# Patient Record
Sex: MALE | Race: WHITE | NOT HISPANIC OR LATINO | Employment: STUDENT | URBAN - METROPOLITAN AREA
[De-identification: names, ages, dates, MRNs, and addresses within clinical notes are randomized per-mention and may not be internally consistent; named-entity substitution may affect disease eponyms.]

---
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
## 2020-10-02 ENCOUNTER — TELEPHONE (OUTPATIENT)
Dept: INTERNAL MEDICINE CLINIC | Age: 20
End: 2020-10-02

## 2020-11-23 ENCOUNTER — TELEPHONE (OUTPATIENT)
Dept: INTERNAL MEDICINE CLINIC | Age: 20
End: 2020-11-23

## 2021-01-05 ENCOUNTER — TELEPHONE (OUTPATIENT)
Dept: INTERNAL MEDICINE CLINIC | Facility: CLINIC | Age: 21
End: 2021-01-05

## 2021-02-05 ENCOUNTER — OFFICE VISIT (OUTPATIENT)
Dept: INTERNAL MEDICINE CLINIC | Facility: CLINIC | Age: 21
End: 2021-02-05
Payer: COMMERCIAL

## 2021-02-05 VITALS
SYSTOLIC BLOOD PRESSURE: 116 MMHG | OXYGEN SATURATION: 99 % | DIASTOLIC BLOOD PRESSURE: 76 MMHG | TEMPERATURE: 99 F | RESPIRATION RATE: 18 BRPM | WEIGHT: 145 LBS | BODY MASS INDEX: 20.3 KG/M2 | HEIGHT: 71 IN | HEART RATE: 54 BPM

## 2021-02-05 DIAGNOSIS — F41.9 ANXIETY: ICD-10-CM

## 2021-02-05 DIAGNOSIS — E55.9 VITAMIN D DEFICIENCY: ICD-10-CM

## 2021-02-05 DIAGNOSIS — Z71.85 VACCINE COUNSELING: ICD-10-CM

## 2021-02-05 DIAGNOSIS — Z13.1 ENCOUNTER FOR SCREENING EXAMINATION FOR IMPAIRED GLUCOSE REGULATION AND DIABETES MELLITUS: ICD-10-CM

## 2021-02-05 DIAGNOSIS — Z87.898 HISTORY OF SUBSTANCE USE: ICD-10-CM

## 2021-02-05 DIAGNOSIS — Z13.220 SCREENING, LIPID: ICD-10-CM

## 2021-02-05 DIAGNOSIS — Z72.0 TOBACCO ABUSE: ICD-10-CM

## 2021-02-05 DIAGNOSIS — F95.9 FACIAL TIC: Primary | ICD-10-CM

## 2021-02-05 PROCEDURE — 99214 OFFICE O/P EST MOD 30 MIN: CPT | Performed by: PHYSICIAN ASSISTANT

## 2021-02-05 RX ORDER — CHOLECALCIFEROL (VITAMIN D3) 125 MCG
2000 CAPSULE ORAL DAILY
Qty: 30 TABLET | Refills: 5 | Status: SHIPPED | OUTPATIENT
Start: 2021-02-05 | End: 2021-10-29 | Stop reason: ALTCHOICE

## 2021-02-05 NOTE — ASSESSMENT & PLAN NOTE
Discused importance of smoking cessation  Discussed tips for cutback and quitting  Set a date, avoid others who smoke, avoid reminders of smoke  Recommended 1800quitnow for added support and assistance for patches if interested  Discussed medications patient is not interested in these at this time  4 week follow up

## 2021-02-05 NOTE — PROGRESS NOTES
[facility]  Standard Office Visit  Patient ID: Rocío Acosta    : 2000  Age/Gender: 21 y o  male     DATE: 2021      Assessment/Plan:    Tobacco abuse  Discused importance of smoking cessation  Discussed tips for cutback and quitting  Set a date, avoid others who smoke, avoid reminders of smoke  Recommended 1800quitnow for added support and assistance for patches if interested  Discussed medications patient is not interested in these at this time  4 week follow up  Facial tic  Patient previously following with neurology with [facility]   2nd opinion given for [facility] however patient has not yet scheduled this visit  Previous neurologists have made recommendations for neuropsychiatry  Patient is willing and will consider  Consult placed today  Advised him to call and schedule  Vitamin D deficiency  Will check vitamin-D level  Advised patient use over-the-counter vitamin-D    Anxiety  Discussed possible treatment options including therapy as well as medications  At this time patient is not interested in any medications  Referral placed today for neuropsychology due to his history of facial tics previously following with neurology  Will check labs prior to follow-up    History of substance use  Commended patient on sobriety from crystal meth  Patient was able to stop using on his own and has a good support system with his mother and friends  Advised him to continue to follow with Psychology as discussed above  Diagnoses and all orders for this visit:    Facial tic  -     Ambulatory referral to Psychology; Future    Anxiety  -     CBC and differential; Future  -     TSH, 3rd generation with Free T4 reflex; Future  -     Ambulatory referral to Psychology;  Future    Vitamin D deficiency  -     Cholecalciferol (Vitamin D3) 50 MCG (2000 UT) TABS; Take 1 tablet (2,000 Units total) by mouth daily  -     Vitamin D 25 hydroxy; Future    Tobacco abuse    Screening, lipid  -     CBC and differential; Future  -     Lipid Panel with Direct LDL reflex; Future    Encounter for screening examination for impaired glucose regulation and diabetes mellitus  -     Comprehensive metabolic panel; Future  -     CBC and differential; Future    History of substance use    Vaccine counseling  Comments:  Risks and benefits of influenza vaccination discussed  Patient verbalized understanding  Patient not interested in vaccination at this time  Depression Screening and Follow-up Plan: Patient's depression screening was positive with a PHQ-2 score of 2  Clincally patient does not have depression  No treatment is required  Tobacco Cessation Counseling: Tobacco cessation counseling was provided  The patient is sincerely urged to quit consumption of tobacco  He is not ready to quit tobacco  Medication options and side effects of medication discussed  Patient refused medication  Patient plans to stop smoking on his own and is not interested in any assistance at this time  Subjective:   Chief Complaint   Patient presents with    Follow-up     7 month vit d def, BMI Adult, Annual exam due, PHQ9 due  No issues or concerns    Flu Vaccine     refused         Jelena Ware is a 21 y o  male who presents to the office on 2/5/2021 for     Follow up  Feeling better at this time  No current complaints or concerns  He notes he wanted to get back on track with his medical follow up  Has not seen a provider in some time  7/2019 - worked at [employer] and notes he left because he did not feel it was a team environment  He also worked at [employer] for a very short period but notes he could not tolerate night shift  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  He has not had any specialist follow up since last visit  He notes that he has not followed with psychiatrist or neurology    He feels his mood is getting better  [employer]  Notes he lives at home with his home with her mother  Mother doing well  He and his Mom get along well  Outside of the home has a small core group of close friends who give him added support  Not currently in any relationship  He notes he has been clean of crystal meth for 1 1/2 years  He notes that he was able to get free of crystal meth using marijuana PRN  He notes that he has not used crystal meth in 1 5 years  He continues to use marijuana every other day at some times and at others his use is more spread out  Last used 2 weeks ago  He has never used any medications IV  Used he would "snort crystal meth "  Notes that when he got clean he did not have any withdrawal sx  When he was using it he reports that he would use it several times per day  He notes this time of the year is always hard on him having lost an aunt around this time several years ago  Feels he is coping better with his loss  Feeling better this year than any other year  He has talked with his friends about it who provide him with support  Has not seen a counselor  Has tried to quit and was successful in the past   Switched from cigarettes to vaping  Has since returned to cigarettes  Currently smokes 4 cigarettes per day (down from 1-1 12/ per day)  Plan to try to quit by summer  Interested in hearing options  Not yet sure of medication  Notes that he turned to crystal meth due to life stressors that have since improved  Has not used in some time  Doing much better  Anxiety  Presents for follow-up visit  Symptoms include excessive worry and nervous/anxious behavior  Patient reports no chest pain, decreased concentration, depressed mood, dizziness, irritability, nausea, palpitations, restlessness or shortness of breath          bmi  The following portions of the patient's history were reviewed and updated as appropriate: allergies, current medications, past family history, past medical history, past social history, past surgical history and problem list     Review of Systems   Constitutional: Negative for chills, fever, irritability and unexpected weight change  HENT:        Patient notes he is overdue for dental visit plans to schedule his routine cleaning  Respiratory: Negative for shortness of breath  Cardiovascular: Negative for chest pain and palpitations  Gastrointestinal: Negative for abdominal pain, diarrhea, nausea and vomiting  Skin: Negative for rash  Neurological: Negative for dizziness and light-headedness  Occasional facial tics which are worse during times of stress and anxiety  Patient does not feel he would like to take a daily medication to help with anxiety  Psychiatric/Behavioral: Negative for decreased concentration  The patient is nervous/anxious            Patient Active Problem List   Diagnosis    PVC (premature ventricular contraction)    Thumb pain, right    Seizures (HCC)    Facial tic    Vitamin D deficiency    Abnormal movements    Spell of abnormal behavior    Tic    Allergic contact dermatitis due to other agents    History of exposure to hazardous bodily fluids    History of suicidal ideation    Cyst on ear    Anxiety    Tobacco abuse    History of substance use       Past Medical History:   Diagnosis Date    ADHD     Asthma     Substance abuse ([facility] [address] ) 2019    crystal meth (last used late 2019)       Past Surgical History:   Procedure Laterality Date    NO PAST SURGERIES           Current Outpatient Medications:     Cholecalciferol (Vitamin D3) 50 MCG (2000 UT) TABS, Take 1 tablet (2,000 Units total) by mouth daily, Disp: 30 tablet, Rfl: 5    Allergies   Allergen Reactions    Bee Pollen     Dog Epithelium     Pollen Extract     David Grass Pollen Allergen     Tree Extract        Social History     Socioeconomic History    Marital status: Single     Spouse name: None    Number of children: None    Years of education: None    Highest education level: None   Occupational History    None   Social Needs    Financial resource strain: None    Food insecurity     Worry: None     Inability: None    Transportation needs     Medical: None     Non-medical: None   Tobacco Use    Smoking status: Current Every Day Smoker     Packs/day: 0 75    Smokeless tobacco: Never Used   Substance and Sexual Activity    Alcohol use: Yes     Comment: occasionally    Drug use: Yes     Types: Marijuana    Sexual activity: None   Lifestyle    Physical activity     Days per week: None     Minutes per session: None    Stress: None   Relationships    Social connections     Talks on phone: None     Gets together: None     Attends [employer]ism service: None     Active member of club or organization: None     Attends meetings of clubs or organizations: None     Relationship status: None    Intimate partner violence     Fear of current or ex partner: None     Emotionally abused: None     Physically abused: None     Forced sexual activity: None   Other Topics Concern    None   Social History Narrative    None       Family History   Problem Relation Age of Onset    Hypertension Mother     No Known Problems Father     Heart disease Maternal Grandmother     Heart disease Maternal Grandfather     Breast cancer Maternal Aunt     Thyroid disease Maternal Aunt         x2    Diabetes Maternal Aunt        PHQ-9 Depression Screening    PHQ-9:   Frequency of the following problems over the past two weeks:      Little interest or pleasure in doing things: 1 - several days  Feeling down, depressed, or hopeless: 1 - several days  PHQ-2 Score: 2         Health Maintenance   Topic Date Due    Pneumococcal Vaccine: Pediatrics (0 to 5 Years) and At-Risk Patients (6 to 59 Years) (1 of 1 - PPSV23) 02/18/2006    HPV Vaccine (1 - Male 2-dose series) 02/18/2011    HIV Screening  02/18/2015    Annual Physical  02/18/2018    Depression Screening PHQ  07/19/2020    Influenza Vaccine (1) 06/30/2021 (Originally 9/1/2020)    DTaP,Tdap,and Td Vaccines (7 - Td) 03/23/2021    BMI: Adult  02/05/2022    HIB Vaccine  Completed    Hepatitis B Vaccine  Completed    IPV Vaccine  Aged Out    Hepatitis A Vaccine  Aged Out    Meningococcal ACWY Vaccine  Aged Out       Immunization History   Administered Date(s) Administered    DTaP 5 2000, 2000, 2000, 07/01/2001, 04/07/2005    Hep B, adult 2000, 2000, 2000    Hib (PRP-OMP) 2000, 2000, 2000, 07/11/2001    INFLUENZA 11/07/2007, 02/22/2010, 10/21/2011    MMR 03/09/2001, 05/05/2004    Meningococcal MCV4P 03/23/2011    Meningococcal, Unknown Serogroups 03/23/2011    Pneumococcal Conjugate 13-Valent 2000, 2000    Tdap 03/23/2011    Varicella 03/09/2001, 01/24/2008        Objective:  Vitals:    02/05/21 1255   BP: 116/76   BP Location: Left arm   Patient Position: Sitting   Cuff Size: Standard   Pulse: (!) 54   Resp: 18   Temp: 99 °F (37 2 °C)   TempSrc: Tympanic   SpO2: 99%   Weight: 65 8 kg (145 lb)   Height: 5' 11" (1 803 m)     Wt Readings from Last 3 Encounters:   02/05/21 65 8 kg (145 lb)   07/19/19 64 1 kg (141 lb 6 4 oz) (29 %, Z= -0 55)*   04/03/19 65 6 kg (144 lb 9 6 oz) (36 %, Z= -0 36)*     * Growth percentiles are based on CDC (Boys, 2-20 Years) data  Body mass index is 20 22 kg/m²  No exam data present       Physical Exam  Vitals signs and nursing note reviewed  Constitutional:       General: He is not in acute distress  Appearance: Normal appearance  He is well-developed  He is not diaphoretic  Comments: Alert pleasant cooperative 43-year-old male seated in room in no acute distress   HENT:      Head: Normocephalic and atraumatic        Right Ear: Tympanic membrane normal       Left Ear: Tympanic membrane normal       Mouth/Throat:      Mouth: Mucous membranes are moist       Pharynx: No oropharyngeal exudate or posterior oropharyngeal erythema  Eyes:      General: No scleral icterus  Right eye: No discharge  Left eye: No discharge  Pupils: Pupils are equal, round, and reactive to light  Neck:      Musculoskeletal: Neck supple  Cardiovascular:      Rate and Rhythm: Regular rhythm  Bradycardia present  Heart sounds: Normal heart sounds  No murmur  Comments: Bradycardic  Regular rate  No murmurs  Pulmonary:      Effort: Pulmonary effort is normal  No respiratory distress  Breath sounds: Normal breath sounds  No wheezing or rales  Comments: Clear to auscultation throughout  No crackles no rhonchi no wheeze  No respiratory distress  Abdominal:      General: Bowel sounds are normal  There is no distension  Palpations: Abdomen is soft  Tenderness: There is no abdominal tenderness  There is no guarding  Comments: Positive bowel sounds soft nontender nondistended   Skin:     General: Skin is warm and dry  Neurological:      General: No focal deficit present  Mental Status: He is alert  Comments: No facial tics noted during exam    No gross focal neurologic deficits   Psychiatric:         Attention and Perception: Attention normal          Mood and Affect: Mood and affect normal          Speech: Speech normal          Behavior: Behavior normal          Thought Content: Thought content normal              Future Appointments   Date Time Provider Brittany Dahiana   3/5/2021 11:30 AM Graham Garcia PA-C [address] [place] [address]    Patient Care Team:  Graham Garcia PA-C as PCP - General (Internal Medicine)    This note was completed in part utilizing [employer]    Grammatical errors, random word insertions, spelling mistakes, and incomplete sentences can be an occasional consequence of this system secondary to software limitations, ambient noise, and hardware issues  If you have any questions or concerns about the content, text, or information contained within the body of this dictation, please contact the provider for clarification

## 2021-02-05 NOTE — ASSESSMENT & PLAN NOTE
Patient previously following with neurology with [facility]   2nd opinion given for [facility] however patient has not yet scheduled this visit  Previous neurologists have made recommendations for neuropsychiatry  Patient is willing and will consider  Consult placed today  Advised him to call and schedule

## 2021-02-05 NOTE — ASSESSMENT & PLAN NOTE
Discussed possible treatment options including therapy as well as medications  At this time patient is not interested in any medications  Referral placed today for neuropsychology due to his history of facial tics previously following with neurology    Will check labs prior to follow-up

## 2021-02-05 NOTE — PATIENT INSTRUCTIONS
Tobacco abuse  Discused importance of smoking cessation  Discussed tips for cutback and quitting  Set a date, avoid others who smoke, avoid reminders of smoke  Recommended 1800quitnow for added support and assistance for patches if interested  Discussed medications patient is not interested in these at this time  4 week follow up  Facial tic  Patient previously following with neurology with [facility]   2nd opinion given for [facility] however patient has not yet scheduled this visit  Previous neurologists have made recommendations for neuropsychiatry  Patient is willing and will consider  Consult placed today  Advised him to call and schedule  Vitamin D deficiency  Will check vitamin-D level  Advised patient use over-the-counter vitamin-D    Anxiety  Discussed possible treatment options including therapy as well as medications  At this time patient is not interested in any medications  Referral placed today for neuropsychology due to his history of facial tics previously following with neurology  Will check labs prior to follow-up    History of substance use  Commended patient on sobriety from crystal meth  Patient was able to stop using on his own and has a good support system with his mother and friends  Advised him to continue to follow with Psychology as discussed above

## 2021-02-05 NOTE — ASSESSMENT & PLAN NOTE
Commended patient on sobriety from crystal meth  Patient was able to stop using on his own and has a good support system with his mother and friends  Advised him to continue to follow with Psychology as discussed above

## 2021-10-29 ENCOUNTER — OFFICE VISIT (OUTPATIENT)
Dept: INTERNAL MEDICINE CLINIC | Facility: CLINIC | Age: 21
End: 2021-10-29
Payer: COMMERCIAL

## 2021-10-29 VITALS
DIASTOLIC BLOOD PRESSURE: 64 MMHG | SYSTOLIC BLOOD PRESSURE: 118 MMHG | OXYGEN SATURATION: 99 % | HEART RATE: 80 BPM | BODY MASS INDEX: 21.39 KG/M2 | TEMPERATURE: 98.6 F | HEIGHT: 71 IN | WEIGHT: 152.8 LBS

## 2021-10-29 DIAGNOSIS — F95.9 TIC: ICD-10-CM

## 2021-10-29 DIAGNOSIS — E55.9 VITAMIN D DEFICIENCY: ICD-10-CM

## 2021-10-29 DIAGNOSIS — Z86.69 HISTORY OF SEIZURE DISORDER: Primary | ICD-10-CM

## 2021-10-29 DIAGNOSIS — Z13.220 SCREENING, LIPID: ICD-10-CM

## 2021-10-29 DIAGNOSIS — F95.9 FACIAL TIC: ICD-10-CM

## 2021-10-29 DIAGNOSIS — Z13.1 ENCOUNTER FOR SCREENING EXAMINATION FOR IMPAIRED GLUCOSE REGULATION AND DIABETES MELLITUS: ICD-10-CM

## 2021-10-29 DIAGNOSIS — Z72.0 TOBACCO ABUSE: ICD-10-CM

## 2021-10-29 DIAGNOSIS — Z86.59 HISTORY OF SUICIDAL IDEATION: ICD-10-CM

## 2021-10-29 PROCEDURE — 99214 OFFICE O/P EST MOD 30 MIN: CPT | Performed by: PHYSICIAN ASSISTANT

## 2021-11-01 ENCOUNTER — TELEPHONE (OUTPATIENT)
Dept: PSYCHIATRY | Facility: CLINIC | Age: 21
End: 2021-11-01

## 2021-11-26 ENCOUNTER — OFFICE VISIT (OUTPATIENT)
Dept: INTERNAL MEDICINE CLINIC | Age: 21
End: 2021-11-26
Payer: COMMERCIAL

## 2021-11-26 VITALS
WEIGHT: 153.2 LBS | BODY MASS INDEX: 21.45 KG/M2 | HEART RATE: 59 BPM | OXYGEN SATURATION: 98 % | DIASTOLIC BLOOD PRESSURE: 50 MMHG | SYSTOLIC BLOOD PRESSURE: 100 MMHG | HEIGHT: 71 IN | TEMPERATURE: 98 F

## 2021-11-26 DIAGNOSIS — Z72.0 TOBACCO ABUSE: ICD-10-CM

## 2021-11-26 DIAGNOSIS — F95.9 FACIAL TIC: ICD-10-CM

## 2021-11-26 DIAGNOSIS — R56.9 SEIZURES (HCC): ICD-10-CM

## 2021-11-26 DIAGNOSIS — R25.9 ABNORMAL MOVEMENTS: ICD-10-CM

## 2021-11-26 DIAGNOSIS — Z87.898 HISTORY OF SUBSTANCE USE: ICD-10-CM

## 2021-11-26 DIAGNOSIS — I49.3 PVC (PREMATURE VENTRICULAR CONTRACTION): Primary | ICD-10-CM

## 2021-11-26 PROCEDURE — 99214 OFFICE O/P EST MOD 30 MIN: CPT | Performed by: PHYSICIAN ASSISTANT

## 2021-11-26 PROCEDURE — 3008F BODY MASS INDEX DOCD: CPT | Performed by: PHYSICIAN ASSISTANT

## 2021-11-26 PROCEDURE — 93000 ELECTROCARDIOGRAM COMPLETE: CPT | Performed by: PHYSICIAN ASSISTANT

## 2022-01-17 ENCOUNTER — TELEPHONE (OUTPATIENT)
Dept: NEUROLOGY | Facility: CLINIC | Age: 22
End: 2022-01-17

## 2022-01-17 NOTE — LETTER
Eulalia Mackey  [address]  [place] [address] [address]      Our office has been unsuccessful in trying to reach you by phone regarding:          ? Office appointment on 2/4/22 with DR Tinsley at 3pm has been canceled, 3 attempts have been made to contact you  Please contact our office at your earliest convenience  Please call [phone]  and follow the prompts        Sincerely,      [address] [facility]

## 2022-01-20 NOTE — TELEPHONE ENCOUNTER
Second attempt at trying to reach pt  Lm asking pt to call back, to reshedule 2/4/22 apt Addie Gimenez  he will not be in office

## 2022-01-21 NOTE — TELEPHONE ENCOUNTER
3rd attempt trying to reach patient about apt, canceling pt apt LM letting him know the apt will be canceled and I will send a letter in the mail as well, asked pt to call back so we can get this rescheduled for him

## 2022-06-24 ENCOUNTER — OFFICE VISIT (OUTPATIENT)
Dept: INTERNAL MEDICINE CLINIC | Facility: OTHER | Age: 22
End: 2022-06-24
Payer: MEDICARE

## 2022-06-24 VITALS
DIASTOLIC BLOOD PRESSURE: 66 MMHG | TEMPERATURE: 98 F | SYSTOLIC BLOOD PRESSURE: 116 MMHG | WEIGHT: 155 LBS | HEART RATE: 61 BPM | HEIGHT: 71 IN | OXYGEN SATURATION: 99 % | BODY MASS INDEX: 21.7 KG/M2

## 2022-06-24 DIAGNOSIS — R56.9 SEIZURES (HCC): ICD-10-CM

## 2022-06-24 DIAGNOSIS — F95.9 FACIAL TIC: ICD-10-CM

## 2022-06-24 DIAGNOSIS — F44.5: Primary | ICD-10-CM

## 2022-06-24 DIAGNOSIS — F41.9 ANXIETY: ICD-10-CM

## 2022-06-24 PROBLEM — L23.89 ALLERGIC CONTACT DERMATITIS DUE TO OTHER AGENTS: Status: RESOLVED | Noted: 2018-08-17 | Resolved: 2022-06-24

## 2022-06-24 PROBLEM — R25.9 ABNORMAL MOVEMENTS: Status: RESOLVED | Noted: 2017-11-06 | Resolved: 2022-06-24

## 2022-06-24 PROBLEM — Q18.1 CYST ON EAR: Status: RESOLVED | Noted: 2019-07-19 | Resolved: 2022-06-24

## 2022-06-24 PROCEDURE — 99214 OFFICE O/P EST MOD 30 MIN: CPT | Performed by: INTERNAL MEDICINE

## 2022-06-24 NOTE — ASSESSMENT & PLAN NOTE
I feel he is appropriate to partaking part-time classes at [employer] while going occupational occasional rehabilitation  He has an upcoming appointment scheduled with Neurology in September 2022

## 2022-06-24 NOTE — LETTER
June 24, 2022     Patient: Sayda Cuadra  YOB: 2000  Date of Visit: 6/24/2022      To Whom it May Concern:    Sayda Cuadra is under my professional care  Elliot Hinojosa was seen in my office on 6/24/2022  I feel it would be appropriate and in Paul's best interest to attend community college on a part-time schedule based on his chronic medical conditions  He should also continue working with occupational vocational rehabilitation program while attending [email or web address]  If you have any questions or concerns, please don't hesitate to call           Sincerely,          Mariana Holter, MD

## 2022-06-24 NOTE — PROGRESS NOTES
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    Seizures ([address] )    Facial tic    Anxiety                Subjective:      Patient ID: Brittney Conklin is a 25 y o  male  Chief Complaint   Patient presents with    Follow-up    ADHD     Pt is needing a written letter that stated the ADHD may not allow a full time school load  Looking to possible bet  [employer] to help with class load          25year old male is seen today for follow up of chronic conditions  He is currently attending occupational vocational rehabilitation ([employer]) and is planning to start classes at [employer]  He has undergone assessment by Dr Olga Pastor to which he was found to meet the dsm-5 criteria for diagnosis of conversion disorder with seizures, persistent, 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  According to his assessment, he states that Mr Jack Gardner does not meet the criteria of ADHD  He was never on treatment for ADHD although admits to recreational methamphetamine use  He was once on Wellbutrin however only took 2 doses in did not feel well or himself and therefore discontinued  He is requesting approval to partake in a part-time schedule at [employer] while working with [employer]         The following portions of the patient's history were reviewed and updated as appropriate: allergies, current medications, past family history, past medical history, past social history, past surgical history and problem list     Review of Systems   Constitutional: Negative for activity change, appetite change, chills, diaphoresis, fatigue and fever  HENT: Negative for congestion, postnasal drip, rhinorrhea, sinus pressure, sinus pain, sneezing and sore throat  Eyes: Negative for visual disturbance  Respiratory: Negative for apnea, cough, choking, chest tightness, shortness of breath and wheezing  Cardiovascular: Negative for chest pain, palpitations and leg swelling  Gastrointestinal: Negative for abdominal distention, abdominal pain, anal bleeding, blood in stool, constipation, diarrhea, nausea and vomiting  Endocrine: Negative for cold intolerance and heat intolerance  Genitourinary: Negative for difficulty urinating, dysuria and hematuria  Musculoskeletal: Negative  Skin: Negative  Neurological: Negative for dizziness, weakness, light-headedness, numbness and headaches  Hematological: Negative for adenopathy  Psychiatric/Behavioral: Negative for agitation, sleep disturbance and suicidal ideas  All other systems reviewed and are negative  Past Medical History:   Diagnosis Date    ADHD     Asthma     Substance abuse ([facility] [address] ) 2019    crystal meth (last used late 2019)       No current outpatient medications on file      Allergies   Allergen Reactions    Bee Pollen     Dog Epithelium     Pollen Extract     David Grass Pollen Allergen     Tree Extract        Social History   Past Surgical History:   Procedure Laterality Date    NO PAST SURGERIES       Family History   Problem Relation Age of Onset    Hypertension Mother     No Known Problems Father     Heart disease Maternal Grandmother     Heart disease Maternal Grandfather     Breast cancer Maternal Aunt     Thyroid disease Maternal Aunt         x2    Diabetes Maternal Aunt        Objective:  /66 (BP Location: Left arm, Patient Position: Sitting, Cuff Size: Adult)   Pulse 61   Temp 98 °F (36 7 °C)   Ht 5' 11" (1 803 m) Wt 70 3 kg (155 lb)   SpO2 99%   BMI 21 62 kg/m²     No results found for this or any previous visit (from the past 1344 hour(s))  Physical Exam  Vitals and nursing note reviewed  Constitutional:       General: He is not in acute distress  Appearance: He is well-developed  He is not diaphoretic  HENT:      Head: Normocephalic and atraumatic  Eyes:      General: No scleral icterus  Right eye: No discharge  Left eye: No discharge  Conjunctiva/sclera: Conjunctivae normal       Pupils: Pupils are equal, round, and reactive to light  Neck:      Thyroid: No thyromegaly  Vascular: No JVD  Cardiovascular:      Rate and Rhythm: Normal rate and regular rhythm  Heart sounds: Normal heart sounds  No murmur heard  No friction rub  No gallop  Pulmonary:      Effort: Pulmonary effort is normal  No respiratory distress  Breath sounds: Normal breath sounds  No wheezing or rales  Chest:      Chest wall: No tenderness  Abdominal:      General: Bowel sounds are normal  There is no distension  Palpations: Abdomen is soft  There is no mass  Tenderness: There is no abdominal tenderness  There is no guarding or rebound  Musculoskeletal:         General: No tenderness or deformity  Normal range of motion  Cervical back: Normal range of motion and neck supple  Lymphadenopathy:      Cervical: No cervical adenopathy  Skin:     General: Skin is warm and dry  Coloration: Skin is not pale  Findings: No erythema or rash  Neurological:      Mental Status: He is alert and oriented to person, place, and time  Cranial Nerves: No cranial nerve deficit  Coordination: Coordination normal       Deep Tendon Reflexes: Reflexes are normal and symmetric  Psychiatric:         Behavior: Behavior normal          Thought Content:  Thought content normal          Judgment: Judgment normal

## 2022-07-28 ENCOUNTER — OFFICE VISIT (OUTPATIENT)
Dept: INTERNAL MEDICINE CLINIC | Age: 22
End: 2022-07-28
Payer: MEDICARE

## 2022-07-28 VITALS
DIASTOLIC BLOOD PRESSURE: 70 MMHG | HEIGHT: 71 IN | TEMPERATURE: 98.1 F | SYSTOLIC BLOOD PRESSURE: 116 MMHG | WEIGHT: 155.4 LBS | BODY MASS INDEX: 21.76 KG/M2 | HEART RATE: 60 BPM | OXYGEN SATURATION: 99 %

## 2022-07-28 DIAGNOSIS — Z11.59 ENCOUNTER FOR HEPATITIS C SCREENING TEST FOR LOW RISK PATIENT: ICD-10-CM

## 2022-07-28 DIAGNOSIS — R22.1 NECK MASS: ICD-10-CM

## 2022-07-28 DIAGNOSIS — R53.83 FATIGUE, UNSPECIFIED TYPE: ICD-10-CM

## 2022-07-28 DIAGNOSIS — H60.02 FURUNCLE OF LEFT EAR: ICD-10-CM

## 2022-07-28 DIAGNOSIS — R59.0 ANTERIOR CERVICAL LYMPHADENOPATHY: Primary | ICD-10-CM

## 2022-07-28 PROCEDURE — 99214 OFFICE O/P EST MOD 30 MIN: CPT | Performed by: PHYSICIAN ASSISTANT

## 2022-07-28 RX ORDER — CEPHALEXIN 500 MG/1
500 CAPSULE ORAL EVERY 6 HOURS SCHEDULED
Qty: 40 CAPSULE | Refills: 0 | Status: SHIPPED | OUTPATIENT
Start: 2022-07-28 | End: 2022-08-07

## 2022-07-28 NOTE — PROGRESS NOTES
Assessment/Plan:         Diagnoses and all orders for this visit:    Anterior cervical lymphadenopathy  Comments:  heat to affected area 10 min intervals 3-4 / day  abx course  f/u in 1 week  check u/s asap  Orders:  -     Lyme Total Antibody Profile with reflex to WB; Future  -     TSH, 3rd generation; Future  -     CBC and differential; Future  -     Comprehensive metabolic panel; Future  -     EBV acute panel; Future  -     US head neck soft tissue; Future  -     cephalexin (KEFLEX) 500 mg capsule; Take 1 capsule (500 mg total) by mouth every 6 (six) hours for 10 days  -     RPR; Future  -     Hepatitis C antibody; Future  -     HIV 1/2 Antigen/Antibody (4th Generation) w Reflex [facility]; Future    Neck mass  -     Lyme Total Antibody Profile with reflex to WB; Future  -     TSH, 3rd generation; Future  -     CBC and differential; Future  -     Comprehensive metabolic panel; Future  -     EBV acute panel; Future  -     US head neck soft tissue; Future  -     RPR; Future  -     HIV 1/2 Antigen/Antibody (4th Generation) w Reflex [facility]; Future    Furuncle of left ear  Comments:  heat affected area   Orders:  -     Lyme Total Antibody Profile with reflex to WB; Future  -     TSH, 3rd generation; Future  -     CBC and differential; Future  -     Comprehensive metabolic panel; Future  -     EBV acute panel; Future  -     cephalexin (KEFLEX) 500 mg capsule; Take 1 capsule (500 mg total) by mouth every 6 (six) hours for 10 days    Fatigue, unspecified type  -     Lyme Total Antibody Profile with reflex to WB; Future  -     TSH, 3rd generation; Future  -     CBC and differential; Future  -     Comprehensive metabolic panel; Future  -     EBV acute panel; Future  -     HIV 1/2 Antigen/Antibody (4th Generation) w Reflex [facility]; Future    Encounter for hepatitis C screening test for low risk patient  -     Hepatitis C antibody;  Future        Pt instructed to reports ear pain, sore throat, tightness in throat, worsening swelling in L side neck immediately  Heat affected area with heating pad or warm washcloth 2-3 / daily   Start abx now, yogurt daily  Schedule u/s asap  F/u in office 5-7 days or sooner if any above sx develop   Check labs asap    Subjective:      Patient ID: Aidee Warren is a 25 y o  male  26 y/o male with c/o lump behind his L ear x 2 weeks, "fluid filled" and tender to touch - mild   Then 1 week ago developed swelling in his neck and tenderness in this area, swollen glands per pt   He reports it has gotten slightly larger since starting  Pt denies any fever, chills, night sweats, excessive fatigue   Afebrile in office  Denies recent URI sx, denies ST, ear pain, h/a, dizziness  Denies any other nodes in the abdomen, axilla or groin     Denies recent travel, camping, swimming in lakes or rivers  Not currently employed and has not occupational exposures   Denies recent animal bites, tick bites or bug bites   Pt has a cat but denies recent bites or scratches       The following portions of the patient's history were reviewed and updated as appropriate: allergies, current medications, past family history, past medical history, past social history, past surgical history and problem list     Review of Systems   Constitutional: Negative for activity change, appetite change, chills, diaphoresis, fatigue and fever  HENT: Negative for congestion, ear discharge, ear pain, facial swelling, postnasal drip, sinus pressure, sinus pain, sore throat and trouble swallowing  Respiratory: Negative for cough, choking, chest tightness, shortness of breath and wheezing  Cardiovascular: Negative for chest pain  Gastrointestinal: Negative for abdominal pain, constipation, diarrhea and nausea  Musculoskeletal: Negative for arthralgias and back pain  Skin: Negative for wound  Neurological: Negative for dizziness, light-headedness and headaches  Hematological: Positive for adenopathy     Psychiatric/Behavioral: Negative for sleep disturbance  The patient is not nervous/anxious  Past Medical History:   Diagnosis Date    ADHD     Asthma     Substance abuse ([address] ) 2019    crystal meth (last used late 2019)         Current Outpatient Medications:     cephalexin (KEFLEX) 500 mg capsule, Take 1 capsule (500 mg total) by mouth every 6 (six) hours for 10 days, Disp: 40 capsule, Rfl: 0    Allergies   Allergen Reactions    Bee Pollen     Cat Hair Extract Sneezing    Dog Epithelium     Pollen Extract     David Grass Pollen Allergen     Tree Extract        Social History   Past Surgical History:   Procedure Laterality Date    NO PAST SURGERIES       Family History   Problem Relation Age of Onset    Hypertension Mother     No Known Problems Father     Heart disease Maternal Grandmother     Heart disease Maternal Grandfather     Breast cancer Maternal Aunt     Thyroid disease Maternal Aunt         x2    Diabetes Maternal Aunt        Objective:  /70 (BP Location: Left arm, Patient Position: Sitting, Cuff Size: Standard)   Pulse 60   Temp 98 1 °F (36 7 °C) (Temporal)   Ht 5' 11" (1 803 m)   Wt 70 5 kg (155 lb 6 4 oz)   SpO2 99%   BMI 21 67 kg/m²        Physical Exam  Vitals reviewed  Constitutional:       General: He is not in acute distress  HENT:      Head: Normocephalic and atraumatic  Right Ear: Tympanic membrane, ear canal and external ear normal       Left Ear: Tympanic membrane, ear canal and external ear normal       Mouth/Throat:      Mouth: Mucous membranes are moist       Pharynx: No oropharyngeal exudate or posterior oropharyngeal erythema  Eyes:      Extraocular Movements: Extraocular movements intact  Conjunctiva/sclera: Conjunctivae normal       Pupils: Pupils are equal, round, and reactive to light  Cardiovascular:      Rate and Rhythm: Normal rate and regular rhythm        Comments: No axillary lymphadenpathy  Pulmonary:      Effort: Pulmonary effort is normal  No respiratory distress  Breath sounds: Normal breath sounds  No wheezing or rales  Musculoskeletal:      Cervical back: Normal range of motion  No rigidity  Right lower leg: No edema  Left lower leg: No edema  Lymphadenopathy:      Cervical: Cervical adenopathy (L sided, tender adenopathy, golf ball size nodes, no warmth or redness) present  Skin:     Findings: Lesion (small cystic pimple / furuncle behind L ear +erythema) present  No rash  Neurological:      General: No focal deficit present  Mental Status: He is alert and oriented to person, place, and time

## 2022-08-01 LAB
ALBUMIN SERPL-MCNC: 4.6 G/DL (ref 3.6–5.1)
ALBUMIN/GLOB SERPL: 2 (CALC) (ref 1–2.5)
ALP SERPL-CCNC: 61 U/L (ref 36–130)
ALT SERPL-CCNC: 16 U/L (ref 9–46)
AST SERPL-CCNC: 12 U/L (ref 10–40)
B BURGDOR AB SER IA-ACNC: <0.9 INDEX
BASOPHILS # BLD AUTO: 17 CELLS/UL (ref 0–200)
BASOPHILS NFR BLD AUTO: 0.3 %
BILIRUB SERPL-MCNC: 0.5 MG/DL (ref 0.2–1.2)
BUN SERPL-MCNC: 23 MG/DL (ref 7–25)
BUN/CREAT SERPL: NORMAL (CALC) (ref 6–22)
CALCIUM SERPL-MCNC: 9.2 MG/DL (ref 8.6–10.3)
CHLORIDE SERPL-SCNC: 104 MMOL/L (ref 98–110)
CO2 SERPL-SCNC: 28 MMOL/L (ref 20–32)
CREAT SERPL-MCNC: 1.14 MG/DL (ref 0.6–1.24)
EBV NA IGG SER IA-ACNC: <18 U/ML
EBV VCA IGG SER IA-ACNC: >750 U/ML
EBV VCA IGM SER IA-ACNC: <36 U/ML
EOSINOPHIL # BLD AUTO: 348 CELLS/UL (ref 15–500)
EOSINOPHIL NFR BLD AUTO: 6.1 %
ERYTHROCYTE [DISTWIDTH] IN BLOOD BY AUTOMATED COUNT: 12.5 % (ref 11–15)
GFR/BSA.PRED SERPLBLD CYS-BASED-ARV: 93 ML/MIN/1.73M2
GLOBULIN SER CALC-MCNC: 2.3 G/DL (CALC) (ref 1.9–3.7)
GLUCOSE SERPL-MCNC: 91 MG/DL (ref 65–99)
HCT VFR BLD AUTO: 45.5 % (ref 38.5–50)
HGB BLD-MCNC: 14.7 G/DL (ref 13.2–17.1)
LYMPHOCYTES # BLD AUTO: 2291 CELLS/UL (ref 850–3900)
LYMPHOCYTES NFR BLD AUTO: 40.2 %
MCH RBC QN AUTO: 29.5 PG (ref 27–33)
MCHC RBC AUTO-ENTMCNC: 32.3 G/DL (ref 32–36)
MCV RBC AUTO: 91.2 FL (ref 80–100)
MONOCYTES # BLD AUTO: 713 CELLS/UL (ref 200–950)
MONOCYTES NFR BLD AUTO: 12.5 %
NEUTROPHILS # BLD AUTO: 2331 CELLS/UL (ref 1500–7800)
NEUTROPHILS NFR BLD AUTO: 40.9 %
PLATELET # BLD AUTO: 229 THOUSAND/UL (ref 140–400)
PMV BLD REES-ECKER: 10.4 FL (ref 7.5–12.5)
POTASSIUM SERPL-SCNC: 4.4 MMOL/L (ref 3.5–5.3)
PROT SERPL-MCNC: 6.9 G/DL (ref 6.1–8.1)
RBC # BLD AUTO: 4.99 MILLION/UL (ref 4.2–5.8)
SODIUM SERPL-SCNC: 138 MMOL/L (ref 135–146)
TSH SERPL-ACNC: 3.38 MIU/L (ref 0.4–4.5)
WBC # BLD AUTO: 5.7 THOUSAND/UL (ref 3.8–10.8)

## 2022-08-05 ENCOUNTER — OFFICE VISIT (OUTPATIENT)
Dept: INTERNAL MEDICINE CLINIC | Facility: OTHER | Age: 22
End: 2022-08-05
Payer: MEDICARE

## 2022-08-05 VITALS
BODY MASS INDEX: 22.4 KG/M2 | DIASTOLIC BLOOD PRESSURE: 80 MMHG | SYSTOLIC BLOOD PRESSURE: 108 MMHG | OXYGEN SATURATION: 98 % | WEIGHT: 160 LBS | TEMPERATURE: 98 F | HEART RATE: 89 BPM | HEIGHT: 71 IN

## 2022-08-05 DIAGNOSIS — H60.02 FURUNCLE OF LEFT EAR: Primary | ICD-10-CM

## 2022-08-05 DIAGNOSIS — R22.1 MASS OF LEFT SIDE OF NECK: ICD-10-CM

## 2022-08-05 DIAGNOSIS — R59.0 ANTERIOR CERVICAL LYMPHADENOPATHY: ICD-10-CM

## 2022-08-05 PROCEDURE — 99213 OFFICE O/P EST LOW 20 MIN: CPT | Performed by: PHYSICIAN ASSISTANT

## 2022-08-05 NOTE — ASSESSMENT & PLAN NOTE
Per history patient to be clinically improving with the addition of Keflex  Complete entire antibiotic course  Recommend warm compresses 4 times daily  Will add additional laboratory studies as discussed below  Close one-week follow-up

## 2022-08-05 NOTE — PATIENT INSTRUCTIONS
Anterior cervical lymphadenopathy  Clinically patient reports his neck and posterior swelling has been improving since being on Keflex  Unfortunately patient was unable to get his antibiotics at the time of last visit and started his antibiotic course on 7/30  He has been taking Keflex as directed  Advised him to complete entire Keflex course  Warm compresses to the affected area 4 times daily  Advised patient to go for ultrasound today to further evaluate  Reviewed labs which does show an old mono infection that is not acutely present  Labs otherwise unremarkable (seveal labs were not drawn)  Will check RPR, HIV, Hep C, bartonella (patient has cat) and CMV in addition  Advised him to go for these labs and keep 1 week follow up  Discussed red flag symptoms and ER precautions which need immediate evaluation and med should they develop  Furuncle of left ear  Per history patient to be clinically improving with the addition of Keflex  Complete entire antibiotic course  Recommend warm compresses 4 times daily  Will add additional laboratory studies as discussed below  Close one-week follow-up  Mass of left side of neck  Printed previous order for ultrasound  Requested front office staff help patient to call and schedule with central scheduling today to confirm that it gets schedule

## 2022-08-05 NOTE — ASSESSMENT & PLAN NOTE
Printed previous order for ultrasound  Requested front office staff help patient to call and schedule with central scheduling today to confirm that it gets schedule

## 2022-08-05 NOTE — ASSESSMENT & PLAN NOTE
Clinically patient reports his neck and posterior swelling has been improving since being on Keflex  Unfortunately patient was unable to get his antibiotics at the time of last visit and started his antibiotic course on 7/30  He has been taking Keflex as directed  Advised him to complete entire Keflex course  Warm compresses to the affected area 4 times daily  Advised patient to go for ultrasound today to further evaluate  Reviewed labs which does show an old mono infection that is not acutely present  Labs otherwise unremarkable (seveal labs were not drawn)  Will check RPR, HIV, Hep C, bartonella (patient has cat) and CMV in addition  Advised him to go for these labs and keep 1 week follow up  Discussed red flag symptoms and ER precautions which need immediate evaluation and med should they develop

## 2022-08-05 NOTE — PROGRESS NOTES
[facility]  Standard Office Visit  Patient ID: Aidee Warren    : 2000  Age/Gender: 25 y o  male     DATE: 2022      Assessment/Plan:    Anterior cervical lymphadenopathy  Clinically patient reports his neck and posterior swelling has been improving since being on Keflex  Unfortunately patient was unable to get his antibiotics at the time of last visit and started his antibiotic course on   He has been taking Keflex as directed  Advised him to complete entire Keflex course  Warm compresses to the affected area 4 times daily  Advised patient to go for ultrasound today to further evaluate  Reviewed labs which does show an old mono infection that is not acutely present  Labs otherwise unremarkable (seveal labs were not drawn)  Will check RPR, HIV, Hep C, bartonella (patient has cat) and CMV in addition  Advised him to go for these labs and keep 1 week follow up  Discussed red flag symptoms and ER precautions which need immediate evaluation and med should they develop  Furuncle of left ear  Per history patient to be clinically improving with the addition of Keflex  Complete entire antibiotic course  Recommend warm compresses 4 times daily  Will add additional laboratory studies as discussed below  Close one-week follow-up  Mass of left side of neck  Printed previous order for ultrasound  Requested front office staff help patient to call and schedule with central scheduling today to confirm that it gets schedule  Diagnoses and all orders for this visit:    Furuncle of left ear  -     Bartonella anitbody panel; Future  -     HIV 1/2 Antigen/Antibody (4th Generation) w Reflex [facility]; Future  -     RPR; Future  -     Hepatitis C antibody; Future  -     CMV IgG/IgM Antibodies; Future    Anterior cervical lymphadenopathy  -     Bartonella anitbody panel;  Future  -     HIV 1/2 Antigen/Antibody (4th Generation) w Reflex [facility]; Future  -     RPR; Future  -     Hepatitis C antibody; Future  -     CMV IgG/IgM Antibodies; Future    Mass of left side of neck  -     Bartonella anitbody panel; Future  -     HIV 1/2 Antigen/Antibody (4th Generation) w Reflex [facility]; Future  -     RPR; Future  -     Hepatitis C antibody; Future  -     CMV IgG/IgM Antibodies; Future                Subjective:   Chief Complaint   Patient presents with    Follow-up     F/U cyst behind left ear, & bump on neck, review labs from 07/30/2022         Laly Robins is a 25 y o  male who presents to the office on 8/5/2022 for     30-year-old male presents to the office for 1 week follow-up after being seen and evaluated for cyst behind his left ear and left neck mass  Patient reports that on 7/28 he was seen by Kanwal Carballo in our office due to worsening left neck and ear masses  His original symptoms started about 10 days prior to his visit with Kanwal Carballo  He reports his rash started with an enlarging cyst behind his left ear that became tender to the touch  Shortly after noticing his swelling behind his left ear he reports that he developed swelling of his left neck  He has not had any difficulty eating or drinking  7/28 he was seen but notes he did not start abx until 3 days later  Started abx 7/30 (currently day 6-7)  He went for his labs but did not yet schedule his ultrasound  He reports that since starting his antibiotic he has seen a reduction in the size the swelling in both his neck and behind his left ear  He notes the areas are less tender to the touch but he has not been pressing on the area  He has not had any difficulty eating or drinking  No rash elsewhere on his body  Denies any fatigue  He does have a cat at home which she had for some time  No other cold symptoms  No fevers no chills  No runny nose no sore throat  Patient does not recall any injury  No abrasions to the skin  No skin piercings  No recent shaving    Patient denies any dental pain or throat pain  Patient denies rash elsewhere on a society  Denies any other lumps in his groin, axilla or in his neck  Patient reports that although he is taking his antibiotic now as directed he has not been doing warm compresses  The area has decreased in size of swelling and has decreased in tenderness per patient  Neck Pain   This is a new problem  The current episode started 1 to 4 weeks ago  The problem occurs daily  The problem has been gradually improving  The pain is associated with nothing  The pain is present in the anterior neck  The pain is mild  The pain is same all the time  Pertinent negatives include no chest pain, fever, pain with swallowing or trouble swallowing  The following portions of the patient's history were reviewed and updated as appropriate: allergies, current medications, past family history, past medical history, past social history, past surgical history and problem list     Review of Systems   Constitutional: Negative for fever  HENT: Negative for dental problem, ear discharge, ear pain, rhinorrhea, sinus pressure, sore throat, trouble swallowing and voice change  Respiratory: Negative for cough, shortness of breath and wheezing  Cardiovascular: Negative for chest pain  Gastrointestinal: Negative for abdominal pain, diarrhea, nausea and vomiting  Musculoskeletal: Positive for neck pain           Patient Active Problem List   Diagnosis    PVC (premature ventricular contraction)    Thumb pain, right    Seizures (HCC)    Facial tic    Vitamin D deficiency    Spell of abnormal behavior    History of exposure to hazardous bodily fluids    History of suicidal ideation    Anxiety    Tobacco abuse    History of substance use    Conversion disorder with attacks or seizures, persistent, 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    Furuncle of left ear    Anterior cervical lymphadenopathy    Mass of left side of neck       Past Medical History: Diagnosis Date    ADHD     Asthma     Substance abuse ([facility] [address] ) 2019    crystal meth (last used late 2019)       Past Surgical History:   Procedure Laterality Date    NO PAST SURGERIES           Current Outpatient Medications:     cephalexin (KEFLEX) 500 mg capsule, Take 1 capsule (500 mg total) by mouth every 6 (six) hours for 10 days, Disp: 40 capsule, Rfl: 0    Allergies   Allergen Reactions    Bee Pollen     Cat Hair Extract Sneezing    Dog Epithelium     Pollen Extract     David Grass Pollen Allergen     Tree Extract        Social History     Socioeconomic History    Marital status: Single     Spouse name: None    Number of children: None    Years of education: None    Highest education level: None   Occupational History    None   Tobacco Use    Smoking status: Current Every Day Smoker     Packs/day: 0 25     Start date: 10/29/2017    Smokeless tobacco: Never Used   Vaping Use    Vaping Use: Every day    Start date: 10/29/2017    Substances: Nicotine, THC, Flavoring   Substance and Sexual Activity    Alcohol use: Yes     Comment: Social    Drug use: Yes     Types: Marijuana    Sexual activity: None   Other Topics Concern    None   Social History Narrative    None     Social Determinants of Health     Financial Resource Strain: Not on file   Food Insecurity: Not on file   Transportation Needs: Not on file   Physical Activity: Not on file   Stress: Not on file   Social Connections: Not on file   Intimate Partner Violence: Not on file   Housing Stability: Not on file       Family History   Problem Relation Age of Onset    Hypertension Mother     No Known Problems Father     Heart disease Maternal Grandmother     Heart disease Maternal Grandfather     Breast cancer Maternal Aunt     Thyroid disease Maternal Aunt         x2    Diabetes Maternal Aunt        PHQ-2/9 Depression Screening           Health Maintenance   Topic Date Due    Hepatitis C Screening  Never done    COVID-19 Vaccine (1) Never done    Pneumococcal Vaccine: Pediatrics (0 to 5 Years) and At-Risk Patients (6 to 59 Years) (1 - PPSV23 or PCV20) 09/08/2001    HPV Vaccine (1 - Male 2-dose series) Never done    HIV Screening  Never done    Annual Physical  Never done    DTaP,Tdap,and Td Vaccines (7 - Td or Tdap) 03/23/2021    Influenza Vaccine (1) 09/01/2022    Depression Screening  06/24/2023    BMI: Adult  08/05/2023    HIB Vaccine  Completed    Hepatitis B Vaccine  Completed    IPV Vaccine  Aged Out    Hepatitis A Vaccine  Aged Out    Meningococcal ACWY Vaccine  Aged Out       Immunization History   Administered Date(s) Administered    DTaP 5 2000, 2000, 2000, 07/01/2001, 04/07/2005    Hep B, adult 2000, 2000, 2000    Hib (PRP-OMP) 2000, 2000, 2000, 07/11/2001    INFLUENZA 11/07/2007, 02/22/2010, 10/21/2011    MMR 03/09/2001, 05/05/2004    Meningococcal MCV4P 03/23/2011    Meningococcal, Unknown Serogroups 03/23/2011    Pneumococcal Conjugate 13-Valent 2000, 2000    Tdap 03/23/2011    Varicella 03/09/2001, 01/24/2008        Objective:  Vitals:    08/05/22 1329   BP: 108/80   BP Location: Left arm   Patient Position: Sitting   Cuff Size: Adult   Pulse: 89   Temp: 98 °F (36 7 °C)   TempSrc: Temporal   SpO2: 98%   Weight: 72 6 kg (160 lb)   Height: 5' 11" (1 803 m)     Wt Readings from Last 3 Encounters:   08/05/22 72 6 kg (160 lb)   07/28/22 70 5 kg (155 lb 6 4 oz)   06/24/22 70 3 kg (155 lb)     Body mass index is 22 32 kg/m²  No exam data present       Physical Exam  Vitals and nursing note reviewed  Constitutional:       General: He is not in acute distress  Appearance: He is well-developed  He is not ill-appearing, toxic-appearing or diaphoretic  Comments: Alert pleasant cooperative  Seated in room in no acute distress   HENT:      Head: Normocephalic and atraumatic          Right Ear: Tympanic membrane normal       Left Ear: Tympanic membrane normal       Mouth/Throat:      Mouth: Mucous membranes are moist       Pharynx: No oropharyngeal exudate or posterior oropharyngeal erythema  Eyes:      General: No scleral icterus  Right eye: No discharge  Left eye: No discharge  Pupils: Pupils are equal, round, and reactive to light  Neck:     Cardiovascular:      Rate and Rhythm: Normal rate and regular rhythm  Heart sounds: Normal heart sounds  No murmur heard  Pulmonary:      Effort: Pulmonary effort is normal  No respiratory distress  Breath sounds: Normal breath sounds  No wheezing or rales  Comments: Clear to auscultation throughout bilaterally  Chest:   Breasts:      Right: No axillary adenopathy or supraclavicular adenopathy  Left: No axillary adenopathy or supraclavicular adenopathy  Abdominal:      General: Bowel sounds are normal  There is no distension  Palpations: Abdomen is soft  Tenderness: There is no abdominal tenderness  There is no guarding  Comments: Positive bowel sounds soft nontender nondistended   Musculoskeletal:      Cervical back: Neck supple  No rigidity or tenderness  No pain with movement  Lymphadenopathy:      Head:      Right side of head: No submental, submandibular, tonsillar, preauricular, posterior auricular or occipital adenopathy  Left side of head: No submental, submandibular, tonsillar, preauricular or posterior auricular adenopathy  Cervical: Cervical adenopathy present  Right cervical: No superficial, deep or posterior cervical adenopathy  Left cervical: Superficial cervical adenopathy present  Upper Body:      Right upper body: No supraclavicular or axillary adenopathy  Left upper body: No supraclavicular or axillary adenopathy  Skin:     General: Skin is warm and dry  Comments: See photo   Neurological:      General: No focal deficit present  Mental Status: He is alert     Psychiatric: Mood and Affect: Mood normal          Behavior: Behavior normal          Thought Content: Thought content normal                         Future Appointments   Date Time Provider Brittany Talbot   8/9/2022 12:30 PM [address] US [address] [facility]   8/12/2022  1:30 PM Ronnell Severs, PA-C [facility]   9/27/2022  9:00 AM Daniel Hernandez MD NEURO [facility]   2/3/2023  2:30 PM Ronnell Severs, PA-C [address]    Patient Care Team:  Ronnell Severs, PA-C as PCP - General (Internal Medicine)  Richy Smith MD as PCP - [address] [place] [address] (RTE)  Richy Smith MD as PCP - [facility] (RTE)    This note was completed in part utilizing [employer] Software  Grammatical errors, random word insertions, spelling mistakes, and incomplete sentences can be an occasional consequence of this system secondary to software limitations, ambient noise, and hardware issues  If you have any questions or concerns about the content, text, or information contained within the body of this dictation, please contact the provider for clarification

## 2022-08-16 ENCOUNTER — TELEPHONE (OUTPATIENT)
Dept: INTERNAL MEDICINE CLINIC | Facility: OTHER | Age: 22
End: 2022-08-16

## 2022-08-16 ENCOUNTER — HOSPITAL ENCOUNTER (OUTPATIENT)
Dept: RADIOLOGY | Facility: IMAGING CENTER | Age: 22
Discharge: HOME | End: 2022-08-16
Payer: MEDICARE

## 2022-08-16 DIAGNOSIS — R59.0 ANTERIOR CERVICAL LYMPHADENOPATHY: ICD-10-CM

## 2022-08-16 DIAGNOSIS — R22.1 NECK MASS: ICD-10-CM

## 2022-08-16 PROCEDURE — 76536 US EXAM OF HEAD AND NECK: CPT

## 2022-08-16 NOTE — TELEPHONE ENCOUNTER
[facility] was calling to ask which bartonella you wanted since they have 3 to choose from   After talking with her the phlebotomist was just going to draw the panel which does the juarez and the henselae together also giving the IGG and IGM

## 2022-08-19 ENCOUNTER — OFFICE VISIT (OUTPATIENT)
Dept: INTERNAL MEDICINE CLINIC | Facility: OTHER | Age: 22
End: 2022-08-19
Payer: MEDICARE

## 2022-08-19 ENCOUNTER — HOSPITAL ENCOUNTER (OUTPATIENT)
Dept: RADIOLOGY | Age: 22
Discharge: HOME | End: 2022-08-19
Payer: MEDICARE

## 2022-08-19 VITALS
WEIGHT: 153 LBS | BODY MASS INDEX: 21.42 KG/M2 | DIASTOLIC BLOOD PRESSURE: 60 MMHG | TEMPERATURE: 98.3 F | HEIGHT: 71 IN | SYSTOLIC BLOOD PRESSURE: 116 MMHG | OXYGEN SATURATION: 99 % | HEART RATE: 58 BPM

## 2022-08-19 DIAGNOSIS — R22.1 MASS OF LEFT SIDE OF NECK: ICD-10-CM

## 2022-08-19 DIAGNOSIS — H60.02 FURUNCLE OF LEFT EAR: ICD-10-CM

## 2022-08-19 DIAGNOSIS — R59.0 ANTERIOR CERVICAL LYMPHADENOPATHY: ICD-10-CM

## 2022-08-19 DIAGNOSIS — R60.9 PAROTID SWELLING: ICD-10-CM

## 2022-08-19 DIAGNOSIS — H60.02 FURUNCLE OF LEFT EAR: Primary | ICD-10-CM

## 2022-08-19 PROCEDURE — G1004 CDSM NDSC: HCPCS

## 2022-08-19 PROCEDURE — 99214 OFFICE O/P EST MOD 30 MIN: CPT | Performed by: PHYSICIAN ASSISTANT

## 2022-08-19 PROCEDURE — 71260 CT THORAX DX C+: CPT

## 2022-08-19 PROCEDURE — 70491 CT SOFT TISSUE NECK W/DYE: CPT

## 2022-08-19 RX ORDER — DOXYCYCLINE 100 MG/1
100 TABLET ORAL 2 TIMES DAILY
Qty: 20 TABLET | Refills: 0 | Status: SHIPPED | OUTPATIENT
Start: 2022-08-19 | End: 2022-08-19 | Stop reason: ALTCHOICE

## 2022-08-19 RX ADMIN — IOHEXOL 65 ML: 350 INJECTION, SOLUTION INTRAVENOUS at 13:44

## 2022-08-19 NOTE — LETTER
[address] [place] [address]  [address] [facility] [address]      August 24, 2022    MRN: [id number]     Phone: [phone]     Dear Mr Alis Best recently had a(n) Cat Scan performed on 8/19/2022 at  [address] [place] [address] that was requested by Justyna Good PA-C  The study was reviewed by a radiologist, which is a physician who specializes in medical imaging  The radiologist issued a report describing his or her findings  In that report there was a finding that the radiologist felt warranted further discussion with your health care provider and that discussion would be beneficial to you  The results were sent to Justyna Good PA-C on 08/19/2022  1:56 PM  We recommend that you contact Justyna Good PA-C at [phone] or set up an appointment to discuss the results of the imaging test  If you have already heard from Justyna Good PA-C regarding the results of your study, you can disregard this letter  This letter is not meant to alarm you, but intended to encourage you to follow-up on your results with the provider that sent you for the imaging study  In addition, we have enclosed answers to frequently asked questions by other patients who have also received a letter to review results with their health care provider (see page two)  Thank you for choosing [address] [place] [address] for your medical imaging needs  FREQUENTLY ASKED QUESTIONS    1  Why am I receiving this letter? [address] [place] [address] requires us to notify patients who have findings on imaging exams that may require more testing or follow-up with a health professional within the next 3 months          2  How serious is the finding on the imaging test?  This letter is sent to all patients who may need follow-up or more testing within the next 3 months  Receiving this letter does not necessarily mean you have a life-threatening imaging finding or disease  Recommendations in the radiologists imaging report are general in nature and it is up to your healthcare provider to say whether those recommendations make sense for your situation  You are strongly encouraged to talk to your health care provider about the results and ask whether additional steps need to be taken  3  Where can I get a copy of the final report for my recent radiology exam?  To get a full copy of the report you can access your records online at [email or web address] or please contact [facility] Records Department at [phone] Monday through Friday between 8 am and 6 pm          4  What do I need to do now? Please contact your health care provider who requested the imaging study to discuss what further actions (if any) are needed  You may have already heard from (your ordering provider) in regard to this test in which case you can disregard this letter  NOTICE IN ACCORDANCE WITH THE [facility] PATIENT TEST RESULT INFORMATION ACT OF 2018    You are receiving this notice as a result of a determination by your diagnostic imaging service that further discussions of your test results are warranted and would be beneficial to you  The complete results of your test or tests have been or will be sent to the health care practitioner that ordered the test or tests  It is recommended that you contact your health care practitioner to discuss your results as soon as possible

## 2022-08-19 NOTE — PATIENT INSTRUCTIONS
Anterior cervical lymphadenopathy  Sent today for stat CT chest and neck with contrast to further evaluate persistant lymphadenopathy despite tx with Keflex (started 7/29)  Lymphadenopathy on exam shows little change  Discussed case and Tx plan with Dr Joyce Severino  Continue warm compresses  Discussed red flag sx and ER precautions which need immediate eval and tx should they develop  Pending CT results will refer to General surgery vs ENT vs IR for Bx  Bartonella pending  Lynette Bar virus positive IgG negative IgM  CMV positive IgG negative IgM  TSH normal  RPR negative  Hep C negative  Lyme negative  HIV negative  CBC Normal WBC  CMP Unremarkable    Called and reviewed CT results with patient  Made him aware of ENT evaluation and treatment  Discussed importance of him eating sour candies since our fruits which can help with parotid swelling  Warm compresses of the defect dairy  Hold on doxycycline at this time for further discussion with ENT at next week's appointment  Discussed if any difficulty making next weeks ear nose and throat appointment he should call and reschedule  Patient and mother both on-call and both verbalized understanding  Mass of left side of neck  Called and spoke with ENT office  Their office does not take patient's insurance but is willing to make exception  Appointment scheduled Tuesday 9:45AM with Dr Quan Hunt  Patient and mother aware of appointment  [address] [facility]  [address]    Phone:  [phone]    Furuncle of left ear  See plan above

## 2022-08-19 NOTE — ASSESSMENT & PLAN NOTE
Called and spoke with ENT office  Their office does not take patient's insurance but is willing to make exception  Appointment scheduled Tuesday 9:45AM with Dr Ruddy Edmond  Patient and mother aware of appointment  [address]    Phone:  [phone]

## 2022-08-19 NOTE — RESULT ENCOUNTER NOTE
Son Burns,  Thanks for the heads up  Patient was seen and evaluated today by myself and sent for additional imaging  He will be seeing ENT next week  I appreciate you following up with me, I just wanted to close the loop  Have a good day      Lalito

## 2022-08-19 NOTE — ASSESSMENT & PLAN NOTE
Sent today for stat CT chest and neck with contrast to further evaluate persistant lymphadenopathy despite tx with Keflex (started 7/29)  Lymphadenopathy on exam shows little change  Discussed case and Tx plan with Dr Ferdinand Gonzalez  Continue warm compresses  Discussed red flag sx and ER precautions which need immediate eval and tx should they develop  Pending CT results will refer to General surgery vs ENT vs IR for Bx  Bartonella pending  Lynette Bar virus positive IgG negative IgM  CMV positive IgG negative IgM  TSH normal  RPR negative  Hep C negative  Lyme negative  HIV negative  CBC Normal WBC  CMP Unremarkable    Called and reviewed CT results with patient  Made him aware of ENT evaluation and treatment  Discussed importance of him eating sour candies since our fruits which can help with parotid swelling  Warm compresses of the defect dairy  Hold on doxycycline at this time for further discussion with ENT at next week's appointment  Discussed if any difficulty making next weeks ear nose and throat appointment he should call and reschedule  Patient and mother both on-call and both verbalized understanding

## 2022-08-19 NOTE — RESULT ENCOUNTER NOTE
Reviewed with patient and Mother results by phone  ENT contacted  Patient schedule appointment next week  See office note for details

## 2022-08-19 NOTE — PROGRESS NOTES
[facility]  Standard Office Visit  Patient ID: Eulalia Mackey    : 2000  Age/Gender: 25 y o  male     DATE: 2022      Assessment/Plan:    Anterior cervical lymphadenopathy  Sent today for stat CT chest and neck with contrast to further evaluate persistant lymphadenopathy despite tx with Keflex (started )  Lymphadenopathy on exam shows little change  Discussed case and Tx plan with Dr Kwesi Bang  Continue warm compresses  Discussed red flag sx and ER precautions which need immediate eval and tx should they develop  Pending CT results will refer to General surgery vs ENT vs IR for Bx  Bartonella pending  Lynette Bar virus positive IgG negative IgM  CMV positive IgG negative IgM  TSH normal  RPR negative  Hep C negative  Lyme negative  HIV negative  CBC Normal WBC  CMP Unremarkable    Called and reviewed CT results with patient  Made him aware of ENT evaluation and treatment  Discussed importance of him eating sour candies since our fruits which can help with parotid swelling  Warm compresses of the defect dairy  Hold on doxycycline at this time for further discussion with ENT at next week's appointment  Discussed if any difficulty making next weeks ear nose and throat appointment he should call and reschedule  Patient and mother both on-call and both verbalized understanding  Mass of left side of neck  Called and spoke with ENT office  Their office does not take patient's insurance but is willing to make exception  Appointment scheduled Tuesday 9:45AM with Dr Robson Ruiz  Patient and mother aware of appointment  [facility] [address]    Phone:  [phone]    Furuncle of left ear  See plan above  Diagnoses and all orders for this visit:    Furuncle of left ear  -     Discontinue: doxycycline (ADOXA) 100 MG tablet;  Take 1 tablet (100 mg total) by mouth 2 (two) times a day for 10 days  -     Cancel: CT soft tissue neck w wo contrast; Future  -     CT chest w contrast; Future  -     Ambulatory Referral to Otolaryngology; Future    Mass of left side of neck  -     Discontinue: doxycycline (ADOXA) 100 MG tablet; Take 1 tablet (100 mg total) by mouth 2 (two) times a day for 10 days  -     Cancel: CT soft tissue neck w wo contrast; Future  -     CT chest w contrast; Future  -     Ambulatory Referral to Otolaryngology; Future    Anterior cervical lymphadenopathy  -     Discontinue: doxycycline (ADOXA) 100 MG tablet; Take 1 tablet (100 mg total) by mouth 2 (two) times a day for 10 days  -     Cancel: CT soft tissue neck w wo contrast; Future  -     CT chest w contrast; Future  -     Ambulatory Referral to Otolaryngology; Future    Parotid swelling  Comments:  Reviewed imaging  Keep ENT appointment  Orders:  -     Ambulatory Referral to Otolaryngology; Future                Subjective:   Chief Complaint   Patient presents with    Follow-up     1 week follow up and review US results from 8/16/2022           Dani Mckeon is a 25 y o  male who presents to the office on 8/19/2022 for       20-year-old male with a history swelling behind his left ear as well as left neck swelling presents to the office for further evaluation after undergoing antibiotic management additional laboratory studies and ultrasound  Patient reports that his left-sided neck swelling and left posterior use swelling is unchanged  The area is not tender but he does note that it is there  No difficulty with eating or drinking  No difficulty with chewing or swallowing  Denies any dental pain  Denies any fevers or chills  Denies any unintentional weight loss  Denies any pain with swallowing  Previous history of substance abuse in the past but denies any history of IV drug use  At this time patient denies any pain in his neck and rates it as a 0 /10    The swelling does not change throughout the day and does not change with change in head positioning or daily activity  He has tried warm compresses without improvement and completed Keflex course  Patient does not have any known history thyroid cancer or lymphoma in the family per his recollection  Neck Pain   This is a recurrent problem  The current episode started 1 to 4 weeks ago  The problem occurs constantly  The problem has been unchanged  The pain is associated with nothing  The pain is present in the anterior neck and left side  The pain is at a severity of 0/10  The patient is experiencing no pain  The pain is same all the time  Pertinent negatives include no chest pain, fever, headaches, pain with swallowing, paresis, trouble swallowing or weight loss  He has tried nothing for the symptoms  The following portions of the patient's history were reviewed and updated as appropriate: allergies, current medications, past family history, past medical history, past social history, past surgical history and problem list     Review of Systems   Constitutional: Negative for chills, fever, unexpected weight change and weight loss  HENT: Negative for dental problem, drooling, postnasal drip, rhinorrhea, sore throat, trouble swallowing and voice change  Respiratory: Negative for cough, shortness of breath and wheezing  Cardiovascular: Negative for chest pain and palpitations  Gastrointestinal: Negative for abdominal pain, diarrhea, nausea and vomiting  Musculoskeletal: Positive for neck pain  Skin: Negative for rash  Neurological: Negative for dizziness and headaches           Patient Active Problem List   Diagnosis    PVC (premature ventricular contraction)    Thumb pain, right    Seizures (HCC)    Facial tic    Vitamin D deficiency    Spell of abnormal behavior    History of exposure to hazardous bodily fluids    History of suicidal ideation    Anxiety    Tobacco abuse    History of substance use    Conversion disorder with attacks or seizures, persistent, 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    Furuncle of left ear    Anterior cervical lymphadenopathy    Mass of left side of neck    Parotid swelling       Past Medical History:   Diagnosis Date    ADHD     Asthma     Substance abuse ([address] ) 2019    crystal meth (last used late 2019)       Past Surgical History:   Procedure Laterality Date    NO PAST SURGERIES         No current outpatient medications on file  No current facility-administered medications for this visit      Allergies   Allergen Reactions    Bee Pollen     Cat Hair Extract Sneezing    Dog Epithelium     Pollen Extract     David Grass Pollen Allergen     Tree Extract        Social History     Socioeconomic History    Marital status: Single     Spouse name: None    Number of children: None    Years of education: None    Highest education level: None   Occupational History    None   Tobacco Use    Smoking status: Current Every Day Smoker     Start date: 10/29/2017    Smokeless tobacco: Never Used    Tobacco comment: Vaping   Vaping Use    Vaping Use: Every day    Start date: 10/29/2017    Substances: Nicotine, THC, Flavoring   Substance and Sexual Activity    Alcohol use: Yes     Comment: Social    Drug use: Yes     Types: Marijuana    Sexual activity: None   Other Topics Concern    None   Social History Narrative    None     Social Determinants of Health     Financial Resource Strain: Not on file   Food Insecurity: Not on file   Transportation Needs: Not on file   Physical Activity: Not on file   Stress: Not on file   Social Connections: Not on file   Intimate Partner Violence: Not on file   Housing Stability: Not on file       Family History   Problem Relation Age of Onset    Hypertension Mother     No Known Problems Father     Heart disease Maternal Grandmother     Heart disease Maternal Grandfather     Breast cancer Maternal Aunt     Thyroid disease Maternal Aunt         x2    Diabetes Maternal Aunt PHQ-2/9 Depression Screening           Health Maintenance   Topic Date Due    COVID-19 Vaccine (1) Never done    Pneumococcal Vaccine: Pediatrics (0 to 5 Years) and At-Risk Patients (6 to 59 Years) (1 - PPSV23 or PCV20) 09/08/2001    HPV Vaccine (1 - Male 2-dose series) Never done    Annual Physical  Never done    DTaP,Tdap,and Td Vaccines (7 - Td or Tdap) 03/23/2021    Influenza Vaccine (1) 09/01/2022    Depression Screening  06/24/2023    BMI: Adult  08/19/2023    HIV Screening  Completed    Hepatitis C Screening  Completed    HIB Vaccine  Completed    Hepatitis B Vaccine  Completed    IPV Vaccine  Aged Out    Hepatitis A Vaccine  Aged Out    Meningococcal ACWY Vaccine  Aged Out       Immunization History   Administered Date(s) Administered    DTaP 5 2000, 2000, 2000, 07/01/2001, 04/07/2005    Hep B, adult 2000, 2000, 2000    Hib (PRP-OMP) 2000, 2000, 2000, 07/11/2001    INFLUENZA 11/07/2007, 02/22/2010, 10/21/2011    MMR 03/09/2001, 05/05/2004    Meningococcal MCV4P 03/23/2011    Meningococcal, Unknown Serogroups 03/23/2011    Pneumococcal Conjugate 13-Valent 2000, 2000    Tdap 03/23/2011    Varicella 03/09/2001, 01/24/2008        Objective:  Vitals:    08/19/22 1130   BP: 116/60   BP Location: Left arm   Patient Position: Sitting   Cuff Size: Adult   Pulse: 58   Temp: 98 3 °F (36 8 °C)   TempSrc: Temporal   SpO2: 99%   Weight: 69 4 kg (153 lb)   Height: 5' 11" (1 803 m)     Wt Readings from Last 3 Encounters:   08/19/22 69 4 kg (153 lb)   08/05/22 72 6 kg (160 lb)   07/28/22 70 5 kg (155 lb 6 4 oz)     Body mass index is 21 34 kg/m²  No exam data present       Physical Exam  Vitals and nursing note reviewed  Constitutional:       General: He is not in acute distress  Appearance: Normal appearance  He is well-developed  He is not diaphoretic  Comments: Alert pleasant cooperative    Seated in room in no acute distress   HENT:      Head: Normocephalic and atraumatic  Right Ear: Tympanic membrane, ear canal and external ear normal  There is no impacted cerumen  Left Ear: Tympanic membrane, ear canal and external ear normal  There is no impacted cerumen  Nose: Nose normal       Mouth/Throat:      Mouth: Mucous membranes are moist       Pharynx: No oropharyngeal exudate or posterior oropharyngeal erythema  Eyes:      General: No scleral icterus  Right eye: No discharge  Left eye: No discharge  Pupils: Pupils are equal, round, and reactive to light  Neck:     Cardiovascular:      Rate and Rhythm: Normal rate and regular rhythm  Heart sounds: Normal heart sounds  No murmur heard  Pulmonary:      Effort: Pulmonary effort is normal  No respiratory distress  Breath sounds: Normal breath sounds  No wheezing or rales  Comments: Clear to auscultation throughout bilaterally  No crackles no rhonchi no wheeze  No respiratory distress  Abdominal:      General: Bowel sounds are normal  There is no distension  Palpations: Abdomen is soft  Tenderness: There is no abdominal tenderness  There is no guarding  Comments: Positive bowel sounds soft nontender   Musculoskeletal:      Cervical back: Neck supple  No rigidity or tenderness  Right lower leg: No edema  Left lower leg: No edema  Lymphadenopathy:      Cervical: Cervical adenopathy present  Skin:     General: Skin is warm and dry  Neurological:      General: No focal deficit present  Mental Status: He is alert and oriented to person, place, and time  Psychiatric:         Mood and Affect: Mood normal          Behavior: Behavior normal          Thought Content:  Thought content normal              Future Appointments   Date Time Provider Brittany Talbot   8/23/2022  9:45 AM Kaiden Shi MD [address] [place] [address] ENT [address]   8/26/2022 11:30 AM Roopa Villarreal PA-C [facility] 9/27/2022  9:00 AM Albert Edmondson MD NEURO [facility]   2/3/2023  2:30 PM Dg Ferguson PA-C [address]    Patient Care Team:  Dg Ferguson PA-C as PCP - General (Internal Medicine)  Ladonna Lin MD as PCP - [address] [place] [address] (RTE)  Ladonna Lin MD as PCP - [facility] (RTE)    This note was completed in part utilizing M-Modal Fluency Direct Software  Grammatical errors, random word insertions, spelling mistakes, and incomplete sentences can be an occasional consequence of this system secondary to software limitations, ambient noise, and hardware issues  If you have any questions or concerns about the content, text, or information contained within the body of this dictation, please contact the provider for clarification

## 2022-08-21 LAB
B HENSELAE IGG SER QL: NEGATIVE
B HENSELAE IGM SER QL: NEGATIVE
B QUINTANA IGG SER QL: NEGATIVE
B QUINTANA IGM SER QL: NEGATIVE
CMV IGG SERPL IA-ACNC: 4.9 U/ML
CMV IGM SERPL IA-ACNC: <30 AU/ML
HCV AB S/CO SERPL IA: 0.07
HCV AB SERPL QL IA: NORMAL
HIV 1+2 AB+HIV1 P24 AG SERPL QL IA: NORMAL
RPR SER QL: NORMAL

## 2022-09-09 ENCOUNTER — OFFICE VISIT (OUTPATIENT)
Dept: INTERNAL MEDICINE CLINIC | Facility: OTHER | Age: 22
End: 2022-09-09
Payer: MEDICARE

## 2022-09-09 VITALS
TEMPERATURE: 98.7 F | DIASTOLIC BLOOD PRESSURE: 78 MMHG | OXYGEN SATURATION: 99 % | HEIGHT: 71 IN | BODY MASS INDEX: 21.98 KG/M2 | SYSTOLIC BLOOD PRESSURE: 110 MMHG | WEIGHT: 157 LBS | HEART RATE: 74 BPM

## 2022-09-09 DIAGNOSIS — R59.0 CERVICAL LYMPHADENOPATHY: Primary | ICD-10-CM

## 2022-09-09 PROCEDURE — 99213 OFFICE O/P EST LOW 20 MIN: CPT | Performed by: PHYSICIAN ASSISTANT

## 2022-09-09 RX ORDER — AMOXICILLIN AND CLAVULANATE POTASSIUM 875; 125 MG/1; MG/1
1 TABLET, FILM COATED ORAL EVERY 12 HOURS SCHEDULED
COMMUNITY

## 2022-09-09 NOTE — PATIENT INSTRUCTIONS
Cervical lymphadenopathy  Reviewed ENT note  Patient is just about complete with Augmentin course but swelling persists  I discussed importance of going for IR biopsy as ordered by ENT  Discussed risks and benefits of procedure  Patient verbalized understanding and is willing  Office will call and schedule prior to him leaving today

## 2022-09-09 NOTE — ASSESSMENT & PLAN NOTE
Reviewed ENT note  Patient is just about complete with Augmentin course but swelling persists  I discussed importance of going for IR biopsy as ordered by ENT  Discussed risks and benefits of procedure  Patient verbalized understanding and is willing  Office will call and schedule prior to him leaving today  Keep ENT follow-up    Keep our office informed of any change in treatment plan

## 2022-09-09 NOTE — PROGRESS NOTES
[facility] [place]  Standard Office Visit  Patient ID: Lea Gonzalez    : 2000  Age/Gender: 25 y o  male     DATE: 2022      Assessment/Plan:    Cervical lymphadenopathy  Reviewed ENT note  Patient is just about complete with Augmentin course but swelling persists  I discussed importance of going for IR biopsy as ordered by ENT  Discussed risks and benefits of procedure  Patient verbalized understanding and is willing  Office will call and schedule prior to him leaving today  Keep ENT follow-up  Keep our office informed of any change in treatment plan       Diagnoses and all orders for this visit:    Cervical lymphadenopathy    Other orders  -     amoxicillin-clavulanate (AUGMENTIN) 875-125 mg per tablet; Take 1 tablet by mouth every 12 (twelve) hours                Subjective:   Chief Complaint   Patient presents with    Follow-up     1 week follow up to review results          Lea Gonzalez is a 25 y o  male who presents to the office on 2022 for       26 yo male to follow up neck/face swelling  He was seen and evaluated by ENT  Given 14 day supply of antibiotic and recommended biopsy  Patient notes there has been little change on the antibiotics  ENT attmpted to drain cyst behind L ear without success  No fevers  No dental pain  NO difficulty eating or drinking  Notes he has some L neck fullness  He had single episode of sharp pain over the area of swelling but has since resolved  Neck Pain   This is a recurrent problem  The problem has been unchanged  The pain is associated with nothing  The pain is present in the anterior neck and left side  Nothing aggravates the symptoms  Pertinent negatives include no chest pain, fever, headaches, pain with swallowing, syncope or trouble swallowing  He has tried NSAIDs and heat (Antibiotics) for the symptoms  The treatment provided no relief         The following portions of the patient's history were reviewed and updated as appropriate: allergies, current medications, past family history, past medical history, past social history, past surgical history and problem list     Review of Systems   Constitutional: Negative for chills, fever and unexpected weight change  HENT: Negative for trouble swallowing  Respiratory: Negative for cough, shortness of breath and wheezing  Cardiovascular: Negative for chest pain, palpitations and syncope  Gastrointestinal: Negative for abdominal pain, diarrhea and vomiting  Musculoskeletal: Positive for neck pain  Neurological: Negative for headaches           Patient Active Problem List   Diagnosis    PVC (premature ventricular contraction)    Thumb pain, right    Seizures (HCC)    Facial tic    Vitamin D deficiency    Spell of abnormal behavior    History of exposure to hazardous bodily fluids    History of suicidal ideation    Anxiety    Tobacco abuse    History of substance use    Conversion disorder with attacks or seizures, persistent, 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    Furuncle of left ear    Cervical lymphadenopathy    Mass of left side of neck    Parotid swelling       Past Medical History:   Diagnosis Date    ADHD     Asthma     [facility]    crystal meth (last used late 2019)       Past Surgical History:   Procedure Laterality Date    NO PAST SURGERIES           Current Outpatient Medications:     amoxicillin-clavulanate (AUGMENTIN) 875-125 mg per tablet, Take 1 tablet by mouth every 12 (twelve) hours, Disp: , Rfl:     Allergies   Allergen Reactions    Bee Pollen     Cat Hair Extract Sneezing    Dog Epithelium     Pollen Extract     David Grass Pollen Allergen     Tree Extract        Social History     Socioeconomic History    Marital status: Single     Spouse name: None    Number of children: None    Years of education: None    Highest education level: None   Occupational History    None   Tobacco Use    Smoking status: Current Every Day Smoker     Start date: 10/29/2017    Smokeless tobacco: Never Used    Tobacco comment: Vaping   Vaping Use    Vaping Use: Every day    Start date: 10/29/2017    Substances: Nicotine, THC, Flavoring   Substance and Sexual Activity    Alcohol use: Yes     Comment: Social    Drug use: Yes     Types: Marijuana    Sexual activity: None   Other Topics Concern    None   Social History Narrative    None     Social Determinants of Health     Financial Resource Strain: Not on file   Food Insecurity: Not on file   Transportation Needs: Not on file   Physical Activity: Not on file   Stress: Not on file   Social Connections: Not on file   Intimate Partner Violence: Not on file   Housing Stability: Not on file       Family History   Problem Relation Age of Onset    Hypertension Mother     No Known Problems Father     Heart disease Maternal Grandmother     Heart disease Maternal Grandfather     Breast cancer Maternal Aunt     Thyroid disease Maternal Aunt         x2    Diabetes Maternal Aunt        PHQ-2/9 Depression Screening    Little interest or pleasure in doing things: 0 - not at all  Feeling down, depressed, or hopeless: 0 - not at all  PHQ-2 Score: 0  PHQ-2 Interpretation: Negative depression screen         Health Maintenance   Topic Date Due    COVID-19 Vaccine (1) Never done    Pneumococcal Vaccine: Pediatrics (0 to 5 Years) and At-Risk Patients (6 to 59 Years) (1 - PPSV23 or PCV20) 09/08/2001    HPV Vaccine (1 - Male 2-dose series) Never done    Annual Physical  Never done    DTaP,Tdap,and Td Vaccines (7 - Td or Tdap) 03/23/2021    Influenza Vaccine (1) 09/01/2022    Depression Screening  09/09/2023    BMI: Adult  09/09/2023    HIV Screening  Completed    Hepatitis C Screening  Completed    HIB Vaccine  Completed    Hepatitis B Vaccine  Completed    IPV Vaccine  Aged Out    Hepatitis A Vaccine  Aged Out    Meningococcal ACWY Vaccine Aged [employer] History   Administered Date(s) Administered    DTaP 5 2000, 2000, 2000, 07/01/2001, 04/07/2005    Hep B, adult 2000, 2000, 2000    Hib (PRP-OMP) 2000, 2000, 2000, 07/11/2001    INFLUENZA 11/07/2007, 02/22/2010, 10/21/2011    MMR 03/09/2001, 05/05/2004    Meningococcal MCV4P 03/23/2011    Meningococcal, Unknown Serogroups 03/23/2011    Pneumococcal Conjugate 13-Valent 2000, 2000    Tdap 03/23/2011    Varicella 03/09/2001, 01/24/2008        Objective:  Vitals:    09/09/22 1330   BP: 110/78   BP Location: Left arm   Patient Position: Sitting   Cuff Size: Adult   Pulse: 74   Temp: 98 7 °F (37 1 °C)   TempSrc: Temporal   SpO2: 99%   Weight: 71 2 kg (157 lb)   Height: 5' 11" (1 803 m)     Wt Readings from Last 3 Encounters:   09/09/22 71 2 kg (157 lb)   08/23/22 69 4 kg (153 lb)   08/19/22 69 4 kg (153 lb)     Body mass index is 21 9 kg/m²  No exam data present       Physical Exam  Vitals and nursing note reviewed  Constitutional:       General: He is not in acute distress  Appearance: He is well-developed  He is not ill-appearing, toxic-appearing or diaphoretic  Comments: Alert pleasant cooperative  Seated in room in no acute distress  HENT:      Head: Normocephalic and atraumatic  Right Ear: Tympanic membrane normal       Left Ear: Tympanic membrane normal       Mouth/Throat:      Mouth: Mucous membranes are moist       Pharynx: No oropharyngeal exudate or posterior oropharyngeal erythema  Eyes:      General: No scleral icterus  Right eye: No discharge  Left eye: No discharge  Pupils: Pupils are equal, round, and reactive to light  Neck:     Cardiovascular:      Rate and Rhythm: Normal rate and regular rhythm  Heart sounds: Normal heart sounds  No murmur heard  Pulmonary:      Effort: Pulmonary effort is normal  No respiratory distress        Breath sounds: Normal breath sounds  No wheezing or rales  Comments: Clear to auscultation throughout bilaterally  Chest:   Breasts:      Right: No axillary adenopathy or supraclavicular adenopathy  Left: No axillary adenopathy or supraclavicular adenopathy  Abdominal:      General: Bowel sounds are normal  There is no distension  Palpations: Abdomen is soft  Tenderness: There is no abdominal tenderness  There is no guarding  Musculoskeletal:      Cervical back: Neck supple  Lymphadenopathy:      Head:      Right side of head: No submandibular, preauricular, posterior auricular or occipital adenopathy  Left side of head: Submandibular and preauricular adenopathy present  No posterior auricular or occipital adenopathy  Cervical: Cervical adenopathy present  Right cervical: No posterior cervical adenopathy  Left cervical: No posterior cervical adenopathy  Upper Body:      Right upper body: No supraclavicular or axillary adenopathy  Left upper body: No supraclavicular or axillary adenopathy  Lower Body: No right inguinal adenopathy  No left inguinal adenopathy  Neurological:      General: No focal deficit present  Mental Status: He is alert  Psychiatric:         Mood and Affect: Mood normal          Behavior: Behavior normal          Thought Content: Thought content normal      See photo            Future Appointments   Date Time Provider Brittany Dahiana   9/19/2022  9:00 AM BE  [facility]   9/27/2022  9:00 AM Nicole Anaya MD NEURO [facility]   2/3/2023  2:30 PM German Wray PA-C [address]    Patient Care Team:  German Wray PA-C as PCP - General (Internal Medicine)  Madina Reyes MD as PCP - [address] [place] [address] (RTE)  Madina Reyes MD as PCP - [facility] (RTE)    This note was completed in part utilizing [employer] Direct Software  Grammatical errors, random word insertions, spelling mistakes, and incomplete sentences can be an occasional consequence of this system secondary to software limitations, ambient noise, and hardware issues  If you have any questions or concerns about the content, text, or information contained within the body of this dictation, please contact the provider for clarification

## 2022-09-12 NOTE — NURSING NOTE
Call placed to patient to discuss upcoming appointment at [facility] radiology department and complete consultation with patient  Patient is having lymph node biopsy utilizing US guidance  Reviewed patient's allergies, no current anticoagulant medication present, also discussed the pre and post procedure expectations  Reminded patient of location and time expected for procedure, Patient expressed understanding by verbalizing and repeating instructions

## 2022-09-19 ENCOUNTER — HOSPITAL ENCOUNTER (OUTPATIENT)
Dept: RADIOLOGY | Facility: HOSPITAL | Age: 22
Discharge: HOME | End: 2022-09-19
Attending: STUDENT IN AN ORGANIZED HEALTH CARE EDUCATION/TRAINING PROGRAM
Payer: MEDICARE

## 2022-09-19 DIAGNOSIS — R59.0 CERVICAL LYMPHADENOPATHY: ICD-10-CM

## 2022-09-19 PROCEDURE — 88305 TISSUE EXAM BY PATHOLOGIST: CPT | Performed by: PATHOLOGY

## 2022-09-19 PROCEDURE — 10005 FNA BX W/US GDN 1ST LES: CPT

## 2022-09-19 PROCEDURE — 88173 CYTOPATH EVAL FNA REPORT: CPT | Performed by: PATHOLOGY

## 2022-09-19 RX ORDER — LIDOCAINE HYDROCHLORIDE 10 MG/ML
2 INJECTION, SOLUTION EPIDURAL; INFILTRATION; INTRACAUDAL; PERINEURAL ONCE
Status: COMPLETED | OUTPATIENT
Start: 2022-09-19 | End: 2022-09-19

## 2022-09-19 RX ADMIN — LIDOCAINE HYDROCHLORIDE 2 ML: 10 INJECTION, SOLUTION EPIDURAL; INFILTRATION; INTRACAUDAL; PERINEURAL at 09:37

## 2022-09-21 PROCEDURE — 88173 CYTOPATH EVAL FNA REPORT: CPT | Performed by: PATHOLOGY

## 2022-09-21 PROCEDURE — 88305 TISSUE EXAM BY PATHOLOGIST: CPT | Performed by: PATHOLOGY

## 2022-09-22 LAB — SCAN RESULT: NORMAL

## 2022-09-27 ENCOUNTER — CONSULT (OUTPATIENT)
Dept: NEUROLOGY | Facility: CLINIC | Age: 22
End: 2022-09-27
Payer: MEDICARE

## 2022-09-27 VITALS
WEIGHT: 163.7 LBS | SYSTOLIC BLOOD PRESSURE: 112 MMHG | BODY MASS INDEX: 22.83 KG/M2 | HEART RATE: 75 BPM | DIASTOLIC BLOOD PRESSURE: 74 MMHG

## 2022-09-27 DIAGNOSIS — F95.9 FACIAL TIC: ICD-10-CM

## 2022-09-27 DIAGNOSIS — F95.9 TIC: ICD-10-CM

## 2022-09-27 DIAGNOSIS — Z86.69 HISTORY OF SEIZURE DISORDER: ICD-10-CM

## 2022-09-27 DIAGNOSIS — F44.5 PSYCHOGENIC NONEPILEPTIC SEIZURE: Primary | ICD-10-CM

## 2022-09-27 PROCEDURE — 99245 OFF/OP CONSLTJ NEW/EST HI 55: CPT | Performed by: STUDENT IN AN ORGANIZED HEALTH CARE EDUCATION/TRAINING PROGRAM

## 2022-09-27 NOTE — PROGRESS NOTES
[place] [address] [facility]  Initial Consultation    Impression/Plan    Aidee Warren is a 25 y o  male with a PMH of Tourette syndrome, ADHD, and ambulatory EEG captured PNES presenting to the [place] [address] [facility] for a second opinion of his PNES  I have reviewed his EEG reports and MRI from [facility] in 2017 and it seems that good quality data was recorded from this time  Typical events were captured from ambulatory EEG with no EEG correlate consistent with PNES  The events he still has are the same since 2017  A video I saw of his events is consistent with PNES as well  At this point due to the good data captured in the past and the relative infrequency of current events I am not interested in repeating his work up for PNES  His neurological exam is normal  Plan outlined below:    #PNES, ambulatory EEG captured  - No AEDs or additional testing  - Referral to Psychiatry for CBT  Educated family on value of CBT over other therapy types  - Consider repeat EEG monitoring only if episodes increase in frequency or change in nature  #Tourette's syndrome  - Mild symptoms which don't require treatment  - Follow up in 6 months or sooner if needed    We discussed the pathophysiology of PNES and safety/precautions including driving restrictions following PNES  Diagnoses and all orders for this visit:    Psychogenic nonepileptic seizure  -     Ambulatory Referral to Psychiatry; Future    History of seizure disorder  -     Ambulatory referral to Neurology    Tic  -     Ambulatory referral to Neurology    Facial tic  -     Ambulatory referral to Neurology        Subjective    Aidee Warren is a 25 y o  male with a PMH of Tourette syndrome, ADHD, and ambulatory EEG captured PNES presenting to the [place] [address] [facility] for a second opinion of his PNES  The patient was previously care for by Dr Yadiel Smith from pediatric neurology at [facility]   His spells that started about 1 year prior and were happening more frequently almost on daily basis when they established care with her  They started around the time he got a driving permit  There was a stressful situtation at that time she her girlfriend had a miscarrage with another man  Per chart review, "he was described to be jerking in his upper body  He had a video of the events that I wants to be what he would be jerking briefly and intermittently for a few seconds during which his eyes closed  No tongue biting no loss of urine or stool  He mentioned he would be able to hear was going on during the events  Also there was no clear postictal phase after the event finished though sometimes he seemed to be tired and confused about what happened  [place] Simple [place] Simple Episodes usually last 2-3 minutes, it can happen several times during the day upto 8 times, sometimes feels tired other times feels fine after the episode "    In 2017 the patient completed an ambulatory EEG which captured multiple PNES  Per chart review, the family was not willing to pursue monitoring for day 3 after the first day captured several PNES   The nature of the non epilepstic spells was discussed with the family  Safety measure during the evnst was also discussed  Family was advised to discuss with his established  psychiatrist and continue looking for counseling   He was referred to adolescent medicine to help with anxiety concerns       He did see a psychologist but he didn't feel like it was helping  The events continued  Its been 4 years since he has seen someone  Since events didn't respond to therapy his mother is concerned that there is another process going on  He believes that he had a seizure on 9/10/2022  He said that he just woke up and it started happening  He reports that he can hear things in the room  He usually can't respond  They last about 30 seconds to two minutes  He will feel a little fatigued before they happen for an unknown amount of time   He will feel weak afterwards  It will take about 1-3 hours to return back to baseline  They occur about once every three months  There are no clear triggers  No tongue biting or incontinence  His mother showed me a video from 2019 of his lying in bed with his eyes closed with intermittent shuddering of his shoulders/arms  His Tourette's is mild at this point with occasional facial movements or vocalizations as his tics  He is on no medication for this currently  Special Features  Age/Date of seizure onset: 15  Status epilepticus: no  Self Injury Seizures: no  Precipitating Factors: none  Longest seizure free interval: q3 months    Epilepsy Risk Factors:  Drug abuse    Current AEDs:  None    Prior AEDs:  None    Prior Evaluation:  - routine EEG 6/16/2017: Normal awake and asleep  - routine EEG 8/18/2017: Normal awake and asleep  - cEEG/EMU 9/25-9/27/2017: Multiple events were captured that are nonepileptic on day 1  Otherwise EEG data was normal    - MRI brain 6/19/2017: Normal    History Reviewed: The following were reviewed and updated as appropriate: allergies, current medications, past family history, past medical history, past social history, past surgical history and problem list     Psychiatric History:  None    Social History:   Driving: No  Lives Alone: No  Occupation: unemployed but wishes to get a job    ROS:  Constitutional: Negative  Negative for appetite change and fever  HENT: Negative  Negative for hearing loss, tinnitus, trouble swallowing and voice change  Eyes: Negative  Negative for photophobia and pain  Respiratory: Negative  Negative for shortness of breath  Cardiovascular: Negative  Negative for palpitations  Gastrointestinal: Negative  Negative for nausea and vomiting  Endocrine: Negative  Negative for cold intolerance  Genitourinary: Negative  Negative for dysuria, frequency and urgency  Musculoskeletal: Negative  Negative for myalgias and neck pain  Skin: Negative  Negative for rash  Neurological: Positive for seizures (09/10/22 few minutes )  Negative for dizziness, tremors, syncope, facial asymmetry, speech difficulty, weakness, light-headedness, numbness and headaches  Hematological: Negative  Does not bruise/bleed easily  Psychiatric/Behavioral: Negative  Negative for confusion, hallucinations and sleep disturbance  All other systems reviewed and are negative  Objective    /74   Pulse 75   Wt 74 3 kg (163 lb 11 2 oz)   BMI 22 83 kg/m²      General Exam  General: well developed, no acute distress  HEENT: mucous membranes moist, anicteric sclera  Neck: supple, good ROM  Extremities: no clubbing, cyanosis or edema  Skin: no rash on visible skin  Neurological Exam  Mental Status: awake, alert, and fully oriented to person, place, time, and situation  Attention and memory intact  Fund of knowledge is appropriate for age and education  There is no neglect  Language: fluency, and comprehension normal        Cranial Nerves: Pupils equal and reactive to light  Visual fields full to confrontation  Extraocular motions intact with full versions, normal pursuits and saccades  Facial strength full and symmetric  Facial sensation intact in V1-V3  Hearing intact to voice  Tongue protrudes to midline  Palate elevates symmetrically  Speech clear without notable dysarthria  Shoulder shrug activation full and symmetric  Motor: Normal bulk and tone  No pronator drift  Strength is 5/5 proximally and distally in all 4 extremities  No involuntary movements  Sensory: Sensation intact to light touch distally in all extremities  Coordination: Normal finger-to-nose  Normal rapid alternating movements  Station and gait: Casual and tandem gait normal  Normal Romberg  Reflexes: Reflexes 2+ throughout and symmetric  Both toes down going        Aide Chester MD   [facility]  [facility] [address] [facility]

## 2022-09-27 NOTE — PROGRESS NOTES
Review of Systems   Constitutional: Negative  Negative for appetite change and fever  HENT: Negative  Negative for hearing loss, tinnitus, trouble swallowing and voice change  Eyes: Negative  Negative for photophobia and pain  Respiratory: Negative  Negative for shortness of breath  Cardiovascular: Negative  Negative for palpitations  Gastrointestinal: Negative  Negative for nausea and vomiting  Endocrine: Negative  Negative for cold intolerance  Genitourinary: Negative  Negative for dysuria, frequency and urgency  Musculoskeletal: Negative  Negative for myalgias and neck pain  Skin: Negative  Negative for rash  Neurological: Positive for seizures (09/10/22 few minutes )  Negative for dizziness, tremors, syncope, facial asymmetry, speech difficulty, weakness, light-headedness, numbness and headaches  Hematological: Negative  Does not bruise/bleed easily  Psychiatric/Behavioral: Negative  Negative for confusion, hallucinations and sleep disturbance  All other systems reviewed and are negative

## 2022-09-29 ENCOUNTER — TELEPHONE (OUTPATIENT)
Dept: PSYCHIATRY | Facility: CLINIC | Age: 22
End: 2022-09-29

## 2022-09-29 NOTE — TELEPHONE ENCOUNTER
Called patient in regards to routine referral  Lvm advising patient to return call to Intake Dept at [phone] to be placed on appropriate wait list

## 2022-10-03 NOTE — TELEPHONE ENCOUNTER
Connected with patient via telephone and verified that the patient is interested in therapy services and would like to be added to the waitlist     He specifically is interested in CBT therapy

## 2023-01-06 ENCOUNTER — OFFICE VISIT (OUTPATIENT)
Dept: INTERNAL MEDICINE CLINIC | Age: 23
End: 2023-01-06

## 2023-01-06 VITALS
TEMPERATURE: 98.6 F | HEIGHT: 71 IN | BODY MASS INDEX: 22.54 KG/M2 | HEART RATE: 83 BPM | OXYGEN SATURATION: 99 % | DIASTOLIC BLOOD PRESSURE: 72 MMHG | WEIGHT: 161 LBS | SYSTOLIC BLOOD PRESSURE: 120 MMHG

## 2023-01-06 DIAGNOSIS — J45.20 MILD INTERMITTENT ASTHMA WITHOUT COMPLICATION: ICD-10-CM

## 2023-01-06 DIAGNOSIS — R68.89 HEAT INTOLERANCE: ICD-10-CM

## 2023-01-06 DIAGNOSIS — I49.3 PVC (PREMATURE VENTRICULAR CONTRACTION): ICD-10-CM

## 2023-01-06 DIAGNOSIS — R07.9 CHEST PAIN, UNSPECIFIED TYPE: ICD-10-CM

## 2023-01-06 DIAGNOSIS — R00.2 PALPITATIONS: ICD-10-CM

## 2023-01-06 DIAGNOSIS — K62.5 RECTAL BLEEDING: Primary | ICD-10-CM

## 2023-01-06 LAB — SL AMB POCT FECES OCC BLD: POSITIVE

## 2023-01-06 RX ORDER — ALBUTEROL SULFATE 90 UG/1
2 AEROSOL, METERED RESPIRATORY (INHALATION) EVERY 6 HOURS PRN
Qty: 8 G | Refills: 2 | Status: SHIPPED | OUTPATIENT
Start: 2023-01-06 | End: 2023-01-06

## 2023-01-06 RX ORDER — LEVALBUTEROL TARTRATE 45 UG/1
1-2 AEROSOL, METERED ORAL EVERY 4 HOURS PRN
Qty: 15 G | Refills: 2 | Status: SHIPPED | OUTPATIENT
Start: 2023-01-06

## 2023-01-06 NOTE — ASSESSMENT & PLAN NOTE
- hemoccult positive on exam  - otherwise no significant findings on MATIAS  - follow up with colorectal as soon as possible  - check CBC, CMP, Iron panel   - follow up PCP 1 month

## 2023-01-06 NOTE — PROGRESS NOTES
Assessment/Plan:    Problem List Items Addressed This Visit        Digestive    Rectal bleeding - Primary     - hemoccult positive on exam  - otherwise no significant findings on MATIAS  - follow up with colorectal as soon as possible  - check CBC, CMP, Iron panel   - follow up PCP 1 month         Relevant Orders    CBC and differential    Iron Panel (Includes Ferritin, Iron Sat%, Iron, and TIBC)    Comprehensive metabolic panel    Ambulatory Referral to Colorectal Surgery       Respiratory    Mild intermittent asthma without complication     - xopenex prescribed prn   - pt with PVCs          Relevant Medications    levalbuterol (Xopenex HFA) 45 mcg/act inhaler       Cardiovascular and Mediastinum    PVC (premature ventricular contraction)     - EKG normal sinus rhythm in office today  - check CBC, CMP, TSH, Mag  - avoid caffeine   - stay well hydrated, advised > 75 oz of water daily  - follow up with cardiology             Other    Chest pain     - EKG normal sinus rhythm, no acute ST or T wave changes  - check CBC, CMP, TSH, Mag  - avoid caffeine   - stay well hydrated, advised > 75 oz of water daily  - follow up with cardiology   - advised ER if symptoms worsen          Relevant Orders    POCT ECG (Completed)   Other Visit Diagnoses     Palpitations        Relevant Orders    TSH, 3rd generation with Free T4 reflex    POCT ECG (Completed)    Magnesium    Heat intolerance        Relevant Orders    TSH, 3rd generation with Free T4 reflex          BMI Counseling: Body mass index is 22 45 kg/m²  [employer] software was used to dictate this note  It may contain errors with dictating incorrect words or incorrect spelling  Please contact the provider directly with any questions  Subjective:      Patient ID: Donovan Garcia is a 25 y o  male  HPI     Patient presents today with concern for rectal bleeding  Onset was a few months ago but symptoms have been worsening   His worse episode was on 12/31, he noticed bright red blood in the toilet and on the toilet tissue  He also had dark red blood clots as well  He has had 2 more episodes since then  His rectal bleeding is only with bowel movements  He denies constipation  He thinks his stools feel "normal" to him besides the blood  He denies straining or hard stools  No change in his diet  No beets     He has mild intermittent asthma and was recently moving furniture and had some trouble breathing  He notes that he has a hx of PVCs and chest pain for which he follows with cardiology  Recently he feels his symptoms have been worse  His chest pain is left sided, sharp and lasts about 45 seconds  Occurs twice a day  The following portions of the patient's history were reviewed and updated as appropriate: allergies, current medications, past family history, past medical history, past social history, past surgical history and problem list     Review of Systems   Constitutional: Negative for chills, fever and unexpected weight change  Cardiovascular: Positive for chest pain and palpitations  Gastrointestinal: Positive for anal bleeding, rectal pain and vomiting (1 episode with exertion)  Negative for constipation, diarrhea and nausea  Endocrine: Positive for heat intolerance  Neurological: Positive for dizziness and light-headedness           Past Medical History:   Diagnosis Date   • ADHD    • Asthma    • Substance abuse ([facility] [address] ) 2019    crystal meth (last used late 2019)         Current Outpatient Medications:   •  levalbuterol (Xopenex HFA) 45 mcg/act inhaler, Inhale 1-2 puffs every 4 (four) hours as needed for wheezing or shortness of breath, Disp: 15 g, Rfl: 2    Allergies   Allergen Reactions   • Bee Pollen    • Cat Hair Extract Sneezing   • Dog Epithelium    • Pollen Extract    • David Grass Pollen Allergen    • Tree Extract        Social History   Past Surgical History:   Procedure Laterality Date   • NO PAST SURGERIES     • US GUIDED LYMPH NODE BIOPSY LEFT 9/19/2022     Family History   Problem Relation Age of Onset   • Hypertension Mother    • No Known Problems Father    • Heart disease Maternal Grandmother    • Heart disease Maternal Grandfather    • Breast cancer Maternal Aunt    • Thyroid disease Maternal Aunt         x2   • Diabetes Maternal Aunt        Objective:  /72 (BP Location: Left arm, Patient Position: Sitting, Cuff Size: Standard)   Pulse 83   Temp 98 6 °F (37 °C) (Temporal)   Ht 5' 11" (1 803 m)   Wt 73 kg (161 lb)   SpO2 99% Comment: room air  BMI 22 45 kg/m²      Physical Exam  Vitals reviewed  Exam conducted with a chaperone present  Constitutional:       General: He is not in acute distress  Appearance: Normal appearance  He is not diaphoretic  HENT:      Head: Normocephalic and atraumatic  Eyes:      Extraocular Movements: Extraocular movements intact  Conjunctiva/sclera: Conjunctivae normal       Pupils: Pupils are equal, round, and reactive to light  Cardiovascular:      Rate and Rhythm: Normal rate and regular rhythm  Heart sounds: Normal heart sounds  No murmur heard  Pulmonary:      Effort: Pulmonary effort is normal  No respiratory distress  Breath sounds: Normal breath sounds  No wheezing, rhonchi or rales  Abdominal:      General: Bowel sounds are normal  There is no distension  Palpations: Abdomen is soft  There is no mass  Tenderness: There is no abdominal tenderness  There is no guarding  Neurological:      Mental Status: He is alert and oriented to person, place, and time  Mental status is at baseline  Psychiatric:         Mood and Affect: Mood normal          Behavior: Behavior normal          Rectal exam: negative without mass, lesions or tenderness, sphincter tone normal, stool guaiac positive

## 2023-01-06 NOTE — ASSESSMENT & PLAN NOTE
- EKG normal sinus rhythm in office today  - check CBC, CMP, TSH, Mag  - avoid caffeine   - stay well hydrated, advised > 75 oz of water daily  - follow up with cardiology

## 2023-01-06 NOTE — PATIENT INSTRUCTIONS
- Go for labs, we will contact you with results  - schedule follow up with colorectal as soon as possible    - for increase in chest pain and palpitations please follow up with your cardiologist  - your EKG in the office today is normal   - for any worsening chest pain, go to the ER   - stay well hydrated, aim for at least 75 oz of water daily  - avoid caffeine

## 2023-01-06 NOTE — ASSESSMENT & PLAN NOTE
- EKG normal sinus rhythm, no acute ST or T wave changes  - check CBC, CMP, TSH, Mag  - avoid caffeine   - stay well hydrated, advised > 75 oz of water daily  - follow up with cardiology   - advised ER if symptoms worsen

## 2023-02-22 LAB
FERRITIN SERPL-MCNC: 105 NG/ML (ref 38–380)
IRON SATN MFR SERPL: 23 % (CALC) (ref 20–48)
IRON SERPL-MCNC: 76 MCG/DL (ref 50–195)
TIBC SERPL-MCNC: 324 MCG/DL (CALC) (ref 250–425)
TSH SERPL-ACNC: 1.38 MIU/L (ref 0.4–4.5)

## 2023-03-09 ENCOUNTER — TELEPHONE (OUTPATIENT)
Dept: INTERNAL MEDICINE CLINIC | Facility: OTHER | Age: 23
End: 2023-03-09

## 2023-03-13 NOTE — TELEPHONE ENCOUNTER
LMOM for pt to call me at [facility] office    Called [facility] x3 with no return call.   Pt does not have pending appt with cardiology that I can see but does have follow up with you on 3/24/2023

## 2023-03-14 NOTE — TELEPHONE ENCOUNTER
Pt stated he was at a Cardiologist the Monday after he was in the ED 2/17/2023 but doesn't remember the name. It was near [facility]. He said he was [facility] . They were supposed to send him home with a heart monitor but they didn't. He said they were rude. I instructed pt to call them and inquire about his heart monitor and ask if he needs a pending appt or follow up.

## 2023-04-04 ENCOUNTER — OFFICE VISIT (OUTPATIENT)
Dept: NEUROLOGY | Facility: CLINIC | Age: 23
End: 2023-04-04

## 2023-04-04 ENCOUNTER — TELEPHONE (OUTPATIENT)
Dept: NEUROLOGY | Facility: CLINIC | Age: 23
End: 2023-04-04

## 2023-04-04 VITALS — SYSTOLIC BLOOD PRESSURE: 106 MMHG | DIASTOLIC BLOOD PRESSURE: 56 MMHG | HEART RATE: 72 BPM

## 2023-04-04 DIAGNOSIS — F44.5 PSYCHOGENIC NONEPILEPTIC SEIZURE: ICD-10-CM

## 2023-04-04 DIAGNOSIS — F95.9 FACIAL TIC: Primary | ICD-10-CM

## 2023-04-04 NOTE — PROGRESS NOTES
Review of Systems   Constitutional: Negative  Negative for appetite change and fever  HENT: Negative  Negative for hearing loss, tinnitus, trouble swallowing and voice change  Eyes: Negative  Negative for photophobia, pain and visual disturbance  Respiratory: Negative  Negative for shortness of breath  Cardiovascular: Negative  Negative for palpitations  Gastrointestinal: Negative  Negative for nausea and vomiting  Endocrine: Negative  Negative for cold intolerance  Genitourinary: Negative  Negative for dysuria, frequency and urgency  Musculoskeletal: Negative  Negative for gait problem, myalgias and neck pain  Skin: Negative  Negative for rash  Allergic/Immunologic: Negative  Neurological: Positive for seizures (dec or jan )  Negative for dizziness, tremors, syncope, facial asymmetry, speech difficulty, weakness, light-headedness, numbness and headaches  Facial twitching    Hematological: Negative  Does not bruise/bleed easily  Psychiatric/Behavioral: Negative  Negative for confusion, hallucinations and sleep disturbance  All other systems reviewed and are negative

## 2023-04-04 NOTE — TELEPHONE ENCOUNTER
----- Message from Erline Moritz, MD sent at 4/4/2023  2:05 PM EDT -----  Patient has been on a waiting list to start cognitive behavioral therapy for 6 months   Can we see if he could get prioritized on the waiting list since he has been on it for so long?    -Dr Elli Clarke

## 2023-04-04 NOTE — PROGRESS NOTES
"[facility] -   Follow up appointment    Impression/Plan    Haylee Black is a 21 y o  male with a PMH of Tourette syndrome, ADHD, and ambulatory EEG captured PNES presenting to the [place] [address] [facility]  He continues to have PNES and is awaiting CBT  Plan outlined below:    #PNES, ambulatory EEG captured  - No AEDs or additional testing  - Will see if we can prioritize him on the waiting list for CBT      #Tourette's syndrome  - Mild symptoms which don't require treatment      - Follow up in 6 months or sooner if needed     We discussed the pathophysiology of PNES and safety/precautions including driving restrictions following PNES  There are no diagnoses linked to this encounter  Subjective    Haylee Black is a 21 y o  male with a PMH of Tourette syndrome, ADHD, and ambulatory EEG captured PNES presenting to the [address] [facility]  For review:     The patient was previously care for by Dr Radha Hurtado from pediatric neurology at [facility]  His spells that started about 1 year prior and were happening more frequently almost on daily basis when they established care with her  They started around the time he got a driving permit  There was a stressful situtation at that time she her girlfriend had a miscarrage with another man  Per chart review, Ramu Graham was described to be jerking in his upper body  He had a video of the events that I wants to be what he would be jerking briefly and intermittently for a few seconds during which his eyes closed  No tongue biting no loss of urine or stool  He mentioned he would be able to hear was going on during the events  Also there was no clear postictal phase after the event finished though sometimes he seemed to be tired and confused about what happened  Isidor Martinis Isidor Martinis Episodes usually last 2-3 minutes, it can happen several times during the day upto 8 times, sometimes feels tired other times feels fine after the episode  \"     In 2017 the " patient completed an ambulatory EEG which captured multiple PNES  Per chart review, the family was not willing to pursue monitoring for day 3 after the first day captured several PNES   The nature of the non epilepstic spells was discussed with the family  Safety measure during the evnst was also discussed  Family was advised to discuss with his established  psychiatrist and continue looking for counseling   He was referred to adolescent medicine to help with anxiety concerns        He did see a psychologist but he didn't feel like it was helping  The events continued  Its been 4 years since he has seen someone  Since events didn't respond to therapy his mother is concerned that there is another process going on      He believed that he had a seizure on 9/10/2022  He said that he just woke up and it started happening  He reports that he can hear things in the room  He usually can't respond  They last about 30 seconds to two minutes  He will feel a little fatigued before they happen for an unknown amount of time  He will feel weak afterwards  It will take about 1-3 hours to return back to baseline  They occur about once every three months  There are no clear triggers  No tongue biting or incontinence  He established care with me on 9/27/2022  I reviewed his EEG reports and MRI from [address] [place] [address] [address] in 2017 and it seems that good quality data was recorded from this time  Typical events were captured from ambulatory EEG with no EEG correlate consistent with PNES  The events he still has are the same since 2017  A video I saw of his events is consistent with PNES as well  Plan was to refer to Psychiatry for CBT  Interval history:    Since last seen, his last seizure was January/February  He continues to also have daily facial tics  He is still on a waiting list for CBT  It has been 6 months since he started waiting       Special Features  Age/Date of seizure onset: 15  Status epilepticus: no  Self Injury Seizures: no  Precipitating Factors: none  Longest seizure free interval: q3 months     Epilepsy Risk Factors:  Drug abuse     Current AEDs:  None     Prior AEDs:  None     Prior Evaluation:  - routine EEG 6/16/2017: Normal awake and asleep  - routine EEG 8/18/2017: Normal awake and asleep  - cEEG/EMU 9/25-9/27/2017: Multiple events were captured that are nonepileptic on day 1  Otherwise EEG data was normal    - MRI brain 6/19/2017: Normal     History Reviewed: The following were reviewed and updated as appropriate: allergies, current medications, past family history, past medical history, past social history, past surgical history and problem list     Psychiatric History:  None     Social History:   Driving: No  Lives Alone: No  Occupation: unemployed but wishes to get a job    History Reviewed: The following were reviewed and updated as appropriate: allergies, current medications, past family history, past medical history, past social history, past surgical history and problem list    ROS:  Constitutional: Negative  Negative for appetite change and fever  HENT: Negative  Negative for hearing loss, tinnitus, trouble swallowing and voice change  Eyes: Negative  Negative for photophobia, pain and visual disturbance  Respiratory: Negative  Negative for shortness of breath  Cardiovascular: Negative  Negative for palpitations  Gastrointestinal: Negative  Negative for nausea and vomiting  Endocrine: Negative  Negative for cold intolerance  Genitourinary: Negative  Negative for dysuria, frequency and urgency  Musculoskeletal: Negative  Negative for gait problem, myalgias and neck pain  Skin: Negative  Negative for rash  Allergic/Immunologic: Negative  Neurological: Positive for seizures (dec or jan )  Negative for dizziness, tremors, syncope, facial asymmetry, speech difficulty, weakness, light-headedness, numbness and headaches  Facial twitching    Hematological: Negative    Does not bruise/bleed easily  Psychiatric/Behavioral: Negative  Negative for confusion, hallucinations and sleep disturbance  All other systems reviewed and are negative        Objective    /56   Pulse 72      General Exam  General: well developed, no acute distress  HEENT: mucous membranes moist, anicteric sclera  Neck: supple, good ROM  Extremities: no clubbing, cyanosis or edema  Skin: no rash on visible skin  Neurological Exam  Mental Status: awake, alert, and fully oriented to person, place, time, and situation  Attention and memory intact  Fund of knowledge is appropriate for age and education  There is no neglect  Language: fluency, and comprehension normal        Cranial Nerves: Pupils equal and reactive to light  Visual fields full to confrontation  Extraocular motions intact with full versions, normal pursuits and saccades  Facial strength full and symmetric  Hearing intact to voice  Tongue protrudes to midline  Palate elevates symmetrically  Speech clear without notable dysarthria  Motor: Normal bulk and tone  No pronator drift  Strength is 5/5 proximally and distally in all 4 extremities  No involuntary movements  Sensory: Sensation intact to light touch distally in all extremities  Coordination: Normal finger-to-nose  Normal rapid alternating movements  Station and gait: Casual and tandem gait normal  Normal Romberg  Reflexes: Reflexes 2+ throughout and symmetric        Misael Cintron MD   [facility]  [facility] [address] [facility]

## 2023-04-04 NOTE — LETTER
Breana Saavedra  [address] [address]                April 10, 2023    Our office has been unsuccessful in trying to reach you by phone regarding:      ? Other:__Provider list for therapists/CBT__________________    Please see the attached list of providers for your review  When calling to schedule, please verify that that the provider you selected still accepts your insurance as lists like these are subject to change  Let me know if I can answer any questions or help you any further  Take care    Thank you,         Sincerely,      [address] [facility]  [phone]

## 2023-04-06 NOTE — TELEPHONE ENCOUNTER
SAILAJA called patient for second time and left message regarding where to send list of providers  Left in message this writer will place in patient's mychart  Will wait to see if patient reads his [employer] message and if not will call once more, then send the information by mail w/ unable to reach letter  SAILAJA remains available

## 2023-04-10 NOTE — TELEPHONE ENCOUNTER
SAILAJA called patient and left third message at [phone]  SW provided list again with unable to reach letter  This information was provided in detailed voice message as well  Patient has contact information if has further needs  No further questions or needs at this time  SW will be available for future social needs as requested

## 2023-06-16 ENCOUNTER — TELEPHONE (OUTPATIENT)
Dept: INTERNAL MEDICINE CLINIC | Facility: OTHER | Age: 23
End: 2023-06-16

## 2023-06-16 NOTE — TELEPHONE ENCOUNTER
You completed a letter for patient last year in June for his college courses  They are requesting a new letter every year so patient is asking if you can place an updated letter in his chart for him?

## 2023-06-16 NOTE — LETTER
June 19, 2023     Patient: Parviz Gasca  YOB: 2000      To Whom it May Concern:    Parviz Gasca is under my professional care  I feel it would be appropriate and in Paul's best interest to attend community college on a part-time schedule based on his chronic medical conditions  He should also continue working with occupational vocational rehabilitation program while attending community college  If you have any questions or concerns, please don't hesitate to call           Sincerely,        Parth Boland MD

## 2023-07-10 ENCOUNTER — TELEPHONE (OUTPATIENT)
Dept: NEUROLOGY | Facility: CLINIC | Age: 23
End: 2023-07-10

## 2023-07-10 NOTE — TELEPHONE ENCOUNTER
Pt's parent has called and stated they received a message to re-schedule Paul's appointment on 10/06/2023 per Provider schedule change. They have re-scheduled to the first available on 11/17/2023, [facility] Alcant[facility] at 2:30 pm and placed on the waiting list.    They also wanted to inform the office that they will be moving about an hour away from us and might call back to cancel all further appointments.     Thank you,     Marya Mccollum

## 2023-10-26 ENCOUNTER — TELEPHONE (OUTPATIENT)
Dept: NEUROLOGY | Facility: CLINIC | Age: 23
End: 2023-10-26

## 2023-10-26 NOTE — LETTER
October 26, 2023     Kaci Carver    Patient: Kaci Carver   YOB: 2000       To Whom It May Concern:      Kaci Carver follows with [facility] for his diagnosis of facial tics and psychogenic nonepileptic seizures. Please feel free to contact our office if anything further is needed.       Sincerely,    Shawna Perez MD

## 2023-10-26 NOTE — TELEPHONE ENCOUNTER
ISIDORO rcvd 10/25/23 at 1:51 pm, taken off 10/26:    Hi my name is Simona Mcdonough. I am calling in regards to [facility] Don. He is reapplying for his medical assistance card and he needs a letter from the doctor with his current diagnosis on that letter. If you can please give me a call back at  #[phone]. I would greatly appreciate it. That letter does need to be into the assistance office no later than October 30th, thank you. Bye.  ______________________________________________________________________________________    Routed to clinical team to follow up.

## 2023-10-26 NOTE — TELEPHONE ENCOUNTER
Letter generated and sent to [employer]. Message left for patient notifying letter completed.

## 2023-11-17 ENCOUNTER — OFFICE VISIT (OUTPATIENT)
Dept: NEUROLOGY | Facility: CLINIC | Age: 23
End: 2023-11-17
Payer: MEDICARE

## 2023-11-17 VITALS
WEIGHT: 147.4 LBS | OXYGEN SATURATION: 99 % | HEIGHT: 71 IN | SYSTOLIC BLOOD PRESSURE: 124 MMHG | DIASTOLIC BLOOD PRESSURE: 82 MMHG | BODY MASS INDEX: 20.64 KG/M2 | HEART RATE: 40 BPM

## 2023-11-17 DIAGNOSIS — F44.5 PSYCHOGENIC NONEPILEPTIC SEIZURE: Primary | ICD-10-CM

## 2023-11-17 PROCEDURE — 99213 OFFICE O/P EST LOW 20 MIN: CPT | Performed by: STUDENT IN AN ORGANIZED HEALTH CARE EDUCATION/TRAINING PROGRAM

## 2023-11-17 NOTE — PROGRESS NOTES
[facility] -   Follow up appointment    Impression/Plan    Lex Torres is a 21 y.o. male with a PMH of Tourette syndrome, ADHD, and ambulatory EEG captured PNES presenting to the [facility] for follow up. He has had only one PNES since last seen. Overall frequency has improved and is still waiting to establish care with CBT. Plan outlined below:    #PNES, ambulatory EEG captured  - No AEDs or additional testing     #Tourette's syndrome  - Mild symptoms which don't require treatment. - Follow up in 6 months or sooner if needed     We discussed the pathophysiology of PNES and safety/precautions including driving restrictions following PNES. There are no diagnoses linked to this encounter. Subjective    Lex Torres is a 21 y.o. male with a PMH of Tourette syndrome, ADHD, and ambulatory EEG captured PNES presenting to the [facility] for follow up. For review:     The patient was previously care for by Dr. Shantel Salas from pediatric neurology at [facility]. His spells that started about 1 year prior and were happening more frequently almost on daily basis when they established care with her. They started around the time he got a driving permit. There was a stressful situtation at that time she her girlfriend had a miscarrage with another man. Per chart review, "he was described to be jerking in his upper body. He had a video of the events that I wants to be what he would be jerking briefly and intermittently for a few seconds during which his eyes closed. No tongue biting no loss of urine or stool. He mentioned he would be able to hear was going on during the events. Also there was no clear postictal phase after the event finished though sometimes he seemed to be tired and confused about what happened. Asha Moore Episodes usually last 2-3 minutes, it can happen several times during the day upto 8 times, sometimes feels tired other times feels fine after the episode."     In 2017 the patient completed an ambulatory EEG which captured multiple PNES. Per chart review, the family was not willing to pursue monitoring for day 3 after the first day captured several PNES. The nature of the non epilepstic spells was discussed with the family. Safety measure during the evnst was also discussed. Family was advised to discuss with his established  psychiatrist and continue looking for counseling. He was referred to adolescent medicine to help with anxiety concerns. He did see a psychologist but he didn't feel like it was helping. The events continued. Its been 4 years since he has seen someone. Since events didn't respond to therapy his mother is concerned that there is another process going on. He believed that he had a seizure on 9/10/2022. He said that he just woke up and it started happening. He reports that he can hear things in the room. He usually can't respond. They last about 30 seconds to two minutes. He will feel a little fatigued before they happen for an unknown amount of time. He will feel weak afterwards. It will take about 1-3 hours to return back to baseline. They occur about once every three months. There are no clear triggers. No tongue biting or incontinence. He established care with me on 9/27/2022. I reviewed his EEG reports and MRI from [address] in 2017 and it seems that good quality data was recorded from this time. Typical events were captured from ambulatory EEG with no EEG correlate consistent with PNES. The events he still has are the same since 2017. A video I saw of his events is consistent with PNES as well. Plan was to refer to Psychiatry for CBT. The patient was last seen in clinic on 4/4/2023. His last seizure prior to that appointment was January/February. He continued to also have daily facial tics. He was still on a waiting list for CBT. Plan at that time was continue to wait for CBT.  Treatment for tics were withheld due to the symptoms being mild. Interval history:    His last PNES was last August. Overall he feels like the frequency of events has improved. He is taking two classes right now. He moved to a new location and is working with his state insurance to find a local provider for CBT. Special Features  Age/Date of seizure onset: 15  Status epilepticus: no  Self Injury Seizures: no  Precipitating Factors: none  Longest seizure free interval: q3 months     Epilepsy Risk Factors:  Drug abuse     Current AEDs:  None     Prior AEDs:  None     Prior Evaluation:  - routine EEG 6/16/2017: Normal awake and asleep  - routine EEG 8/18/2017: Normal awake and asleep  - cEEG/EMU 9/25-9/27/2017: Multiple events were captured that are nonepileptic on day 1. Otherwise EEG data was normal.   - MRI brain 6/19/2017: Normal     History Reviewed: The following were reviewed and updated as appropriate: allergies, current medications, past family history, past medical history, past social history, past surgical history and problem list     Psychiatric History:  None    History Reviewed: The following were reviewed and updated as appropriate: allergies, current medications, past family history, past medical history, past social history, past surgical history, and problem list    ROS:  A 12 system review of system was obtained and otherwise negative except as per HPI. Objective    /82 (BP Location: Left arm, Patient Position: Sitting, Cuff Size: Adult)   Pulse (!) 40   Ht 5' 11" (1.803 m)   Wt 66.9 kg (147 lb 6.4 oz)   SpO2 99%   BMI 20.56 kg/m²      General Exam  General: well developed, no acute distress. HEENT: mucous membranes moist, anicteric sclera. Neck: supple, good ROM. Extremities: no clubbing, cyanosis or edema. Skin: no rash on visible skin. Neurological Exam  Mental Status: awake, alert, and fully oriented to person, place, time, and situation. Attention and memory intact.  Fund of knowledge is appropriate for age and education. There is no neglect. Language: fluency, and comprehension normal.       Cranial Nerves: Pupils equal and reactive to light. Visual fields full to confrontation. Extraocular motions intact with full versions, normal pursuits and saccades. Facial strength full and symmetric. Hearing intact to voice. Tongue protrudes to midline. Palate elevates symmetrically. Speech clear without notable dysarthria. Motor: Normal bulk and tone. No pronator drift. Strength is 5/5 proximally and distally in all 4 extremities. No involuntary movements. Sensory: Sensation intact to light touch distally in all extremities. Coordination: Normal finger-to-nose. Normal rapid alternating movements. Station and gait: Casual and tandem gait normal. Normal Romberg. Reflexes: Reflexes 2+ throughout and symmetric.       Hope De Leon MD   [facility]  [address] [place] [address] Neurology and Clinical Neurophysiology

## 2024-04-26 ENCOUNTER — TELEPHONE (OUTPATIENT)
Dept: NEUROLOGY | Facility: CLINIC | Age: 24
End: 2024-04-26

## 2024-04-26 NOTE — TELEPHONE ENCOUNTER
mom drives pt to appts- he had another appt on Wed 5/1 so mom will not have to take 2 days off from work - pt is asking to r/s appt on 5/3 to 5/1. Advised no slots available. Pt understood.

## 2024-05-01 ENCOUNTER — OFFICE VISIT (OUTPATIENT)
Dept: URGENT CARE | Facility: CLINIC | Age: 24
End: 2024-05-01
Payer: COMMERCIAL

## 2024-05-01 VITALS
WEIGHT: 156 LBS | BODY MASS INDEX: 21.76 KG/M2 | RESPIRATION RATE: 16 BRPM | OXYGEN SATURATION: 99 % | TEMPERATURE: 98.5 F | HEART RATE: 87 BPM

## 2024-05-01 DIAGNOSIS — L01.00 IMPETIGO: Primary | ICD-10-CM

## 2024-05-01 PROCEDURE — 99283 EMERGENCY DEPT VISIT LOW MDM: CPT | Performed by: PHYSICIAN ASSISTANT

## 2024-05-01 PROCEDURE — G0382 LEV 3 HOSP TYPE B ED VISIT: HCPCS | Performed by: PHYSICIAN ASSISTANT

## 2024-05-01 RX ORDER — CEPHALEXIN 500 MG/1
500 CAPSULE ORAL EVERY 8 HOURS SCHEDULED
Qty: 21 CAPSULE | Refills: 0 | Status: SHIPPED | OUTPATIENT
Start: 2024-05-01 | End: 2024-05-08

## 2024-05-01 NOTE — PROGRESS NOTES
[facility]    NAME: Paul Mondragon is a 24 y.o. male  : 2000    MRN: [id number]  DATE: May 1, 2024  TIME: 7:51 PM    Assessment and Plan   Impetigo [L01.00]  1. Impetigo  cephalexin (KEFLEX) 500 mg capsule    mupirocin (BACTROBAN) 2 % ointment          Patient Instructions     Patient Instructions   Antibiotic and cream as directed.  Contact precautions given.      Chief Complaint     Chief Complaint   Patient presents with    Rash     2 months. Started on arms getting worse. Noted areas on lips and eyes. Mild itching. Treating with moisturizer and vaseline.        History of Present Illness   24-year-old male here with complaint of rash that started on his right arm.  Now has more spots on his face and on both of his arms and extremities.  They are not weeping and crusted over.  Denies itching.  Has been present for about a month and is spreading.        Review of Systems   Review of Systems   Constitutional:  Negative for chills, fatigue and fever.   HENT:  Negative for congestion, ear pain, postnasal drip, sinus pressure and sore throat.    Respiratory:  Negative for cough, shortness of breath and wheezing.    Skin:  Positive for rash.   Neurological:  Negative for headaches.   All other systems reviewed and are negative.      Current Medications     Current Outpatient Medications:     cephalexin (KEFLEX) 500 mg capsule, Take 1 capsule (500 mg total) by mouth every 8 (eight) hours for 7 days, Disp: 21 capsule, Rfl: 0    mupirocin (BACTROBAN) 2 % ointment, Apply topically 3 (three) times a day, Disp: 30 g, Rfl: 0    NON FORMULARY, Medical marijuana, Disp: , Rfl:     levalbuterol (Xopenex HFA) 45 mcg/act inhaler, Inhale 1-2 puffs every 4 (four) hours as needed for wheezing or shortness of breath (Patient not taking: Reported on 2024), Disp: 15 g, Rfl: 2    Current Allergies     Allergies as of 2024 - Reviewed 2024   Allergen Reaction Noted    Bee pollen  2016    Cat hair  extract Sneezing 11/09/2017    Dog epithelium  11/09/2017    Pollen extract  08/30/2016    David grass pollen allergen  08/30/2016    Tree extract  08/30/2016          The following portions of the patient's history were reviewed and updated as appropriate: allergies, current medications, past family history, past medical history, past social history, past surgical history and problem list.   Past Medical History:   Diagnosis Date    ADHD     Asthma     Substance abuse (HCC) 2019    crystal meth (last used late 2019)     Past Surgical History:   Procedure Laterality Date    NO PAST SURGERIES      US GUIDED LYMPH NODE BIOPSY LEFT  9/19/2022     Family History   Problem Relation Age of Onset    Hypertension Mother     No Known Problems Father     Heart disease Maternal Grandmother     Heart disease Maternal Grandfather     Breast cancer Maternal Aunt     Thyroid disease Maternal Aunt         x2    Diabetes Maternal Aunt      Social History     Socioeconomic History    Marital status: Single     Spouse name: Not on file    Number of children: Not on file    Years of education: Not on file    Highest education level: Not on file   Occupational History    Not on file   Tobacco Use    Smoking status: Former     Types: Cigarettes     Start date: 10/29/2017     Passive exposure: Current    Smokeless tobacco: Never    Tobacco comments:     Vaping   Vaping Use    Vaping status: Former    Start date: 10/29/2017    Substances: Nicotine, THC, Flavoring   Substance and Sexual Activity    Alcohol use: Yes     Comment: occ    Drug use: Yes     Types: Marijuana    Sexual activity: Not on file   Other Topics Concern    Not on file   Social History Narrative    Not on file     Social Determinants of Health     Financial Resource Strain: Not on file   Food Insecurity: Not on file   Transportation Needs: Not on file   Physical Activity: Not on file   Stress: Not on file   Social Connections: Not on file   Intimate Partner Violence:  Not on file   Housing Stability: Not on file     Medications have been verified.    Objective   Pulse 87   Temp 98.5 °F (36.9 °C)   Resp 16   Wt 70.8 kg (156 lb)   SpO2 99%   BMI 21.76 kg/m²      Physical Exam   Physical Exam  Vitals and nursing note reviewed.   Constitutional:       General: He is not in acute distress.     Appearance: He is well-developed.   HENT:      Head: Normocephalic and atraumatic.      Right Ear: Tympanic membrane normal.      Left Ear: Tympanic membrane normal.      Nose: No mucosal edema.   Cardiovascular:      Rate and Rhythm: Normal rate and regular rhythm.      Heart sounds: Normal heart sounds.   Pulmonary:      Effort: Pulmonary effort is normal. No respiratory distress.      Breath sounds: Normal breath sounds.   Skin:     Findings: Rash (Honey crusted rash in multiple spots on bilateral upper extremities, trunk and face) present.

## 2024-05-14 ENCOUNTER — OFFICE VISIT (OUTPATIENT)
Dept: FAMILY MEDICINE CLINIC | Facility: CLINIC | Age: 24
End: 2024-05-14
Payer: COMMERCIAL

## 2024-05-14 VITALS
BODY MASS INDEX: 22.06 KG/M2 | HEART RATE: 84 BPM | HEIGHT: 71 IN | OXYGEN SATURATION: 98 % | RESPIRATION RATE: 18 BRPM | DIASTOLIC BLOOD PRESSURE: 82 MMHG | TEMPERATURE: 97.6 F | WEIGHT: 157.6 LBS | SYSTOLIC BLOOD PRESSURE: 110 MMHG

## 2024-05-14 DIAGNOSIS — Z00.00 ENCOUNTER FOR MEDICAL EXAMINATION TO ESTABLISH CARE: Primary | ICD-10-CM

## 2024-05-14 DIAGNOSIS — R59.0 POSTERIOR CERVICAL LYMPHADENOPATHY: ICD-10-CM

## 2024-05-14 DIAGNOSIS — R21 RASH: ICD-10-CM

## 2024-05-14 PROCEDURE — 99213 OFFICE O/P EST LOW 20 MIN: CPT

## 2024-05-14 RX ORDER — PREDNISONE 50 MG/1
50 TABLET ORAL DAILY
Qty: 5 TABLET | Refills: 0 | Status: SHIPPED | OUTPATIENT
Start: 2024-05-14 | End: 2024-05-19

## 2024-05-23 ENCOUNTER — OFFICE VISIT (OUTPATIENT)
Dept: FAMILY MEDICINE CLINIC | Facility: CLINIC | Age: 24
End: 2024-05-23
Payer: COMMERCIAL

## 2024-05-23 VITALS
TEMPERATURE: 98 F | SYSTOLIC BLOOD PRESSURE: 97 MMHG | OXYGEN SATURATION: 98 % | DIASTOLIC BLOOD PRESSURE: 70 MMHG | HEART RATE: 83 BPM | RESPIRATION RATE: 18 BRPM | WEIGHT: 158.8 LBS | BODY MASS INDEX: 22.15 KG/M2

## 2024-05-23 DIAGNOSIS — R21 RASH: Primary | ICD-10-CM

## 2024-05-23 PROCEDURE — 99214 OFFICE O/P EST MOD 30 MIN: CPT | Performed by: PHYSICIAN ASSISTANT

## 2024-05-23 RX ORDER — DOXYCYCLINE HYCLATE 100 MG/1
100 CAPSULE ORAL EVERY 12 HOURS SCHEDULED
Qty: 14 CAPSULE | Refills: 0 | Status: SHIPPED | OUTPATIENT
Start: 2024-05-23 | End: 2024-05-30

## 2024-05-23 RX ORDER — METHYLPREDNISOLONE 4 MG/1
TABLET ORAL
Qty: 21 EACH | Refills: 0 | Status: SHIPPED | OUTPATIENT
Start: 2024-05-23

## 2024-05-23 NOTE — PROGRESS NOTES
Assessment/Plan:      Diagnoses and all orders for this visit:    Rash  -     methylPREDNISolone 4 MG tablet therapy pack; Use as directed on package  -     doxycycline hyclate (VIBRAMYCIN) 100 mg capsule; Take 1 capsule (100 mg total) by mouth every 12 (twelve) hours for 7 days          FUP 1 week to ensure resolution, if non will refer to derm     Subjective:     Patient ID: Paul Mondragon is a 24 y.o. male presents for follow up of rash.    Has been on going for about 1 month. Evaluated at  and diagnosed with impetigo. Treated with keflex and mupricion with little relief. Subsequently treated with 5 days of presnisone. He did note some improvement but it returned shortly after stopping. It is not localized to his bilateral eyes and right lip. At times it itches. It drains clear pus. No vision changes. No new medications     HPI    Review of Systems   Constitutional: Negative.    HENT: Negative.     Respiratory: Negative.     Cardiovascular: Negative.    Gastrointestinal: Negative.    Skin:  Positive for rash.         Objective:     Physical Exam  Vitals reviewed.   Constitutional:       General: He is not in acute distress.     Appearance: Normal appearance. He is well-developed.   HENT:      Head: Normocephalic and atraumatic.      Comments: Erythematous, confluent rash with honeycomb crusting to bilateral under eyes. Scant clear drainage   Eyes:      Conjunctiva/sclera: Conjunctivae normal.      Pupils: Pupils are equal, round, and reactive to light.   Neck:      Thyroid: No thyromegaly.   Cardiovascular:      Rate and Rhythm: Normal rate and regular rhythm.      Heart sounds: Normal heart sounds. No murmur heard.  Pulmonary:      Effort: Pulmonary effort is normal. No respiratory distress.      Breath sounds: Normal breath sounds. No wheezing.   Abdominal:      General: Bowel sounds are normal. There is no distension.      Palpations: Abdomen is soft.      Tenderness: There is no abdominal tenderness.    Musculoskeletal:         General: Normal range of motion.      Cervical back: Normal range of motion and neck supple.   Skin:     General: Skin is warm and dry.   Neurological:      Mental Status: He is alert and oriented to person, place, and time.   Psychiatric:         Behavior: Behavior normal.         Thought Content: Thought content normal.         Judgment: Judgment normal.

## 2024-05-30 NOTE — PROGRESS NOTES
FAMILY MEDICINE OFFICE VISIT  [facility]      NAME: Paul Mondragon  AGE: 24 y.o. SEX: male    DATE OF ENCOUNTER: 5/14/2024    Assessment and Plan     1. Encounter for medical examination to establish care  2. Rash  Comments:  Suspect petyriasis Paolo  Will start topical steroid ointment  recommend to keep skin moist  Orders:  -     predniSONE 50 mg tablet; Take 1 tablet (50 mg total) by mouth daily for 5 days  3. Posterior cervical lymphadenopathy  -     Ambulatory Referral to Otolaryngology; Future; Expected date: 05/28/2024      Paul Mondragon is 24 y.o. male presented to the office to establish care complaints of rash. Differentials include eczema, atopy,  Petyriasis paolo. Recommend trail of topical steroids and daily moisturizer use.   Complaints of persistent posterior cervical lymphadenopathy. Had work up in 2022 with ultrasound guided LN biopsy done at that time.   As per [facility] report 09/2022: Non-diagnostic  Mixed inflammatory cells with red blood cells, most compatible with blood elements.  Leukemia/Lymphoma flow cytometry test at the same time, canceled due to no viable cells present.   Patient lost follow up after with his doctor.  Because of persistent lymphadenopathy he would like to follow up on this.       Chief Complaint     Chief Complaint   Patient presents with    Establish Care    Rash     Patient states he has a rash for about 3 months ago.        History of Present Illness     Patient is 23 yo M presented to he office with complaints of rash on body. Started 3 months ago. Started with a small round to oval scaly lesion on right for arm initially. Other lesions appeared all over the body at later time.   Also reports swollen LN in the back of the neck. Had biopsy done at that time couple of year ago which is normal.   Allergic to pollen, cats and dogs. No previous h/o eczema, dry skin problems.   Denies fever, chills, nausea, vomiting, abdominal pain, chest pain, SOB,  "change in urination and bowel movement.           The following portions of the patient's history were reviewed and updated as appropriate: allergies, current medications, past family history, past medical history, past social history, past surgical history and problem list.    Review of Systems     Review of Systems   Respiratory:  Negative for wheezing.    All other systems reviewed and are negative.      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 Exam conducted with a chaperone present.   Constitutional:       General: He is not in acute distress.     Appearance: Normal appearance.   HENT:      Head: Normocephalic and atraumatic.      Right Ear: External ear normal.      Left Ear: External ear normal.      Nose: Nose normal. No congestion or rhinorrhea.      Mouth/Throat:      Mouth: Mucous membranes are moist.      Pharynx: Oropharynx is clear. No oropharyngeal exudate or posterior oropharyngeal erythema.   Eyes:      General: No scleral icterus.        Right eye: No discharge.         Left eye: No discharge.      Extraocular Movements: " Extraocular movements intact.      Conjunctiva/sclera: Conjunctivae normal.      Pupils: Pupils are equal, round, and reactive to light.   Cardiovascular:      Rate and Rhythm: Regular rhythm.      Pulses: Normal pulses.      Heart sounds: Normal heart sounds. No murmur heard.  Pulmonary:      Effort: Pulmonary effort is normal.      Breath sounds: Normal breath sounds. No wheezing.   Abdominal:      General: Bowel sounds are normal.      Palpations: Abdomen is soft.      Tenderness: There is no abdominal tenderness.   Musculoskeletal:      Cervical back: Neck supple.      Right lower leg: No edema.      Left lower leg: No edema.   Lymphadenopathy:      Cervical: Cervical adenopathy (posterior LN) present.   Skin:     General: Skin is warm and dry.      Capillary Refill: Capillary refill takes less than 2 seconds.      Findings: Rash (cicular to oval patche with non erythematous base. Covered with scaly dry skin. Involving both upper and lower extremities. limited involvement of trunk.  no surrounding inflammation noted.) present.   Neurological:      General: No focal deficit present.      Mental Status: He is alert and oriented to person, place, and time.   Psychiatric:         Behavior: Behavior normal.         Pertinent Laboratory/Diagnostic Studies:    [facility] Cervical Lymph node, 09/19/2022:    Final Diagnosis A-B-C. Lymph Node, Left Level II (ThinPrep, smear and cell block preparations) Non-diagnostic Mixed inflammatory cells with red blood cells, most compatible with blood elements. Electronically signed by Paul Medina MD on 9/21/2022 at 1607 Note The sample shows limited scattered mixed inflammatory cells including neutrophils, lymphocytes, and monocytes with associated red blood cells and rare debris with artifactual poor preservation. A portion of the sample was submitted for flow cytometric analysis though testing was canceled due to low cellular viability. Overall, the findings are most compatible  with predominantly peripheral blood contamination without sufficient lymphoid tissue to definitively confirm lymph node sampling. Correlation with clinical impression with consideration for repeat tissue sampling if clinical suspicion persists is recommended as clinically indicated.        ULTRASOUND-GUIDED NECK LYMPH NODE BIOPSY 09/19/2022:     HISTORY: 22 year-old with history of cervical lymphadenopathy.      COMPARISON: CT neck 8/19/2022. Neck ultrasound 8/16/2022     FINDINGS:      Limited ultrasound of the left neck demonstrates multiple enlarged lymph nodes, including the previously noted 4.5 x 1.8 x 2.4 cm node which was targeted for biopsy.      The procedure was explained to the patient, including risks of hemorrhage, infection and local injury. Informed consent was freely obtained. The patient verbalized expressed understanding of the above risks and wished to proceed with the procedure.     PROCEDURE: The neck was prepped and draped in normal sterile fashion. Under real-time ultrasound guidance and local anesthesia 9 passes with a 25 gauge needle were made through an enlarged left cervical level 2 lymph node.       Cytopathology was present and deemed these initial passes inadequate, therefore 4 additional passes with a 25-gauge needle were performed. Post biopsy imaging demonstrated no hematoma.      The patient tolerated the procedure well. Postprocedure instructions were provided for the patient. I asked the patient to call us with any questions, concerns, or acute problems. The patient expressed understanding of the above.      IMPRESSION:     Status post ultrasound-guided left neck lymph node biopsy.        Workstation performed: [id number]    Current Medications     Current Outpatient Medications:     levalbuterol (Xopenex HFA) 45 mcg/act inhaler, Inhale 1-2 puffs every 4 (four) hours as needed for wheezing or shortness of breath, Disp: 15 g, Rfl: 2    NON FORMULARY, Medical marijuana, Disp: ,  Rfl:     doxycycline hyclate (VIBRAMYCIN) 100 mg capsule, Take 1 capsule (100 mg total) by mouth every 12 (twelve) hours for 7 days, Disp: 14 capsule, Rfl: 0    methylPREDNISolone 4 MG tablet therapy pack, Use as directed on package, Disp: 21 each, Rfl: 0    Health Maintenance     Health Maintenance   Topic Date Due    Pneumococcal Vaccine: Pediatrics (0 to 5 Years) and At-Risk Patients (6 to 64 Years) (1 of 1 - PPSV23 or PCV20) 02/18/2006    HPV Vaccine (1 - Male 3-dose series) Never done    Annual Physical  Never done    DTaP,Tdap,and Td Vaccines (7 - Td or Tdap) 03/23/2021    COVID-19 Vaccine (1 - 2023-24 season) Never done    Influenza Vaccine (Season Ended) 09/01/2024    Depression Screening  05/14/2025    BMI: Adult  05/23/2025    Zoster Vaccine (1 of 2) 02/18/2050    RSV Vaccine Age 60+ Years (1 - 1-dose 60+ series) 02/18/2060    HIV Screening  Completed    Hepatitis C Screening  Completed    HIB Vaccine  Completed    RSV Vaccine age 0-20 Months  Aged Out    IPV Vaccine  Aged Out    Hepatitis A Vaccine  Aged Out    Meningococcal ACWY Vaccine  Aged Out     Immunization History   Administered Date(s) Administered    DTaP 5 2000, 2000, 2000, 07/01/2001, 04/07/2005    Hep B, adult 2000, 2000, 2000    Hib (PRP-OMP) 2000, 2000, 2000, 07/11/2001    INFLUENZA 11/07/2007, 02/22/2010, 10/21/2011    MMR 03/09/2001, 05/05/2004    Meningococcal MCV4, Unspecified 03/23/2011    Meningococcal MCV4P 03/23/2011    Meningococcal, Unknown Serogroups 03/23/2011    Pneumococcal Conjugate 13-Valent 2000, 2000    Tdap 03/23/2011    Varicella 03/09/2001, 01/24/2008       Depression Screening and Follow-up Plan: Patient was screened for depression during today's encounter. They screened negative with a PHQ-2 score of 0.         Hillary Cespedes MD   Family Medicine  PGY- 3  5/14/2024 12:11 PM

## 2024-05-31 ENCOUNTER — OFFICE VISIT (OUTPATIENT)
Dept: FAMILY MEDICINE CLINIC | Facility: HOME HEALTHCARE | Age: 24
End: 2024-05-31
Payer: COMMERCIAL

## 2024-05-31 VITALS
OXYGEN SATURATION: 98 % | DIASTOLIC BLOOD PRESSURE: 78 MMHG | RESPIRATION RATE: 18 BRPM | WEIGHT: 157.4 LBS | HEIGHT: 71 IN | HEART RATE: 78 BPM | TEMPERATURE: 97.9 F | BODY MASS INDEX: 22.04 KG/M2 | SYSTOLIC BLOOD PRESSURE: 129 MMHG

## 2024-05-31 DIAGNOSIS — R21 RASH: Primary | ICD-10-CM

## 2024-05-31 PROCEDURE — T1015 CLINIC SERVICE: HCPCS | Performed by: STUDENT IN AN ORGANIZED HEALTH CARE EDUCATION/TRAINING PROGRAM

## 2024-05-31 RX ORDER — FLUCONAZOLE 150 MG/1
150 TABLET ORAL ONCE
Qty: 1 TABLET | Refills: 0 | Status: CANCELLED | OUTPATIENT
Start: 2024-05-31 | End: 2024-05-31

## 2024-05-31 RX ORDER — CLOTRIMAZOLE 1 %
CREAM (GRAM) TOPICAL 2 TIMES DAILY
Qty: 45 G | Refills: 0 | Status: SHIPPED | OUTPATIENT
Start: 2024-05-31 | End: 2024-06-10

## 2024-05-31 NOTE — PROGRESS NOTES
"Ambulatory Visit  Name: Paul Mondragon      : 2000      MRN: [id number]  Encounter Provider: Sandy Mcpherson MD  Encounter Date: 2024   Encounter department: [facility]    Assessment & Plan   1. Rash       History of Present Illness   {Disappearing Hyperlinks I Encounters * My Last Note * Since Last Visit * History :79405}  HPI    Review of Systems  {Select to Display PMH (Optional):39223}  Objective   {Disappearing Hyperlinks   Review Vitals * Enter New Vitals * Results Review * Labs * Imaging * Cardiology * Procedures * Lung Cancer Screening :08007}  /78 (BP Location: Left arm, Patient Position: Sitting, Cuff Size: Standard)   Pulse 78   Temp 97.9 °F (36.6 °C)   Resp 18   Ht 5' 11\" (1.803 m)   Wt 71.4 kg (157 lb 6.4 oz)   SpO2 98%   BMI 21.95 kg/m²     Physical Exam              Administrative Statements {Disappearing Hyperlinks I  Level of Service * PCMH/PCSP:71412}  {Time Spent Statement (Optional):90133}        " he has around his eyes it still present.  The rash on the arm and leg resolved.  Please see pictures from 5/14/2024 to compare.  I did take today pictures of rash around his eyes..  Patient reports that it is not that itchy and he does not notice any discharge.  No vision change, no new lotions no no over-the-counter medications.  No history of allergy.    May 1 course of Keflex 500 mg and Bactroban  May 14 he did a course of prednisone for 10 days  May 23 he did a course of prednisone 4 mg tablets therapy pack and doxycycline 100 mg capsule twice a day for 7 days.    Review of Systems   Constitutional:  Negative for chills and fever.   HENT:  Negative for ear pain and sore throat.    Eyes:  Negative for pain and visual disturbance.   Respiratory:  Negative for cough and shortness of breath.    Cardiovascular:  Negative for chest pain and palpitations.   Gastrointestinal:  Negative for abdominal pain and vomiting.   Genitourinary:  Negative for dysuria and hematuria.   Musculoskeletal:  Negative for arthralgias and back pain.   Skin:  Positive for rash. Negative for pallor.   Neurological:  Negative for seizures and syncope.   All other systems reviewed and are negative.    Medical History Reviewed by provider this encounter:       Current Outpatient Medications on File Prior to Visit   Medication Sig Dispense Refill    levalbuterol (Xopenex HFA) 45 mcg/act inhaler Inhale 1-2 puffs every 4 (four) hours as needed for wheezing or shortness of breath 15 g 2    NON FORMULARY Medical marijuana      methylPREDNISolone 4 MG tablet therapy pack Use as directed on package (Patient not taking: Reported on 5/31/2024) 21 each 0     No current facility-administered medications on file prior to visit.      Social History     Tobacco Use    Smoking status: Former     Types: Cigarettes     Start date: 10/29/2017     Passive exposure: Current    Smokeless tobacco: Never   Vaping Use    Vaping status: Former    Start date: 10/29/2017  "   Substances: Nicotine, THC, Flavoring   Substance and Sexual Activity    Alcohol use: Yes     Comment: Occasionally    Drug use: Yes     Types: Marijuana    Sexual activity: Not Currently     Objective     /78 (BP Location: Left arm, Patient Position: Sitting, Cuff Size: Standard)   Pulse 78   Temp 97.9 °F (36.6 °C)   Resp 18   Ht 5' 11\" (1.803 m)   Wt 71.4 kg (157 lb 6.4 oz)   SpO2 98%   BMI 21.95 kg/m²     Physical Exam  Vitals and nursing note reviewed.   Constitutional:       General: He is not in acute distress.     Appearance: He is well-developed.   HENT:      Head: Normocephalic and atraumatic.   Eyes:      General:         Right eye: No discharge.         Left eye: No discharge.      Extraocular Movements: Extraocular movements intact.      Conjunctiva/sclera: Conjunctivae normal.   Cardiovascular:      Rate and Rhythm: Normal rate and regular rhythm.      Heart sounds: No murmur heard.  Pulmonary:      Effort: Pulmonary effort is normal. No respiratory distress.      Breath sounds: Normal breath sounds.   Abdominal:      Palpations: Abdomen is soft.   Musculoskeletal:         General: No swelling.      Cervical back: Neck supple.   Skin:     General: Skin is warm.      Capillary Refill: Capillary refill takes less than 2 seconds.      Findings: Rash present.   Neurological:      General: No focal deficit present.      Mental Status: He is alert and oriented to person, place, and time.   Psychiatric:         Mood and Affect: Mood normal.                   Administrative Statements   I have spent a total time of 25 minutes on 06/03/24 In caring for this patient including Diagnostic results, Prognosis, Risks and benefits of tx options, Patient and family education, Impressions, Counseling / Coordination of care, and Obtaining or reviewing history  .        "

## 2024-06-03 ENCOUNTER — HOSPITAL ENCOUNTER (EMERGENCY)
Facility: HOSPITAL | Age: 24
Discharge: HOME | End: 2024-06-03
Attending: EMERGENCY MEDICINE
Payer: COMMERCIAL

## 2024-06-03 ENCOUNTER — TELEPHONE (OUTPATIENT)
Age: 24
End: 2024-06-03

## 2024-06-03 VITALS
TEMPERATURE: 98.2 F | HEART RATE: 50 BPM | RESPIRATION RATE: 18 BRPM | DIASTOLIC BLOOD PRESSURE: 74 MMHG | OXYGEN SATURATION: 95 % | SYSTOLIC BLOOD PRESSURE: 139 MMHG

## 2024-06-03 DIAGNOSIS — L30.9 PERIORBITAL DERMATITIS: Primary | ICD-10-CM

## 2024-06-03 PROBLEM — R21 RASH: Status: ACTIVE | Noted: 2024-06-03

## 2024-06-03 PROCEDURE — 99284 EMERGENCY DEPT VISIT MOD MDM: CPT | Performed by: EMERGENCY MEDICINE

## 2024-06-03 PROCEDURE — 99282 EMERGENCY DEPT VISIT SF MDM: CPT

## 2024-06-03 RX ORDER — METHYLPREDNISOLONE 4 MG/1
TABLET ORAL
Qty: 21 TABLET | Refills: 0 | Status: SHIPPED | OUTPATIENT
Start: 2024-06-03 | End: 2024-06-10

## 2024-06-03 NOTE — ASSESSMENT & PLAN NOTE
-Rash for almost a month unclear etiology.  Was seen by urgent care May 1 given Keflex 500 mg and Bactroban.  -Was seen by us May 14 given prednisone for 10 days  -Was seen as a follow-up by us on May 23 given prednisone and doxycycline 100 mg capsule twice a day for 7 days.  -Initially rash on arm leg and around eye   -Currently rash only around eyes, not itchy no discharge no new medications no over-the-counter medications no new lotions no history of allergy  -No vision change no prior history of rash.  -Patient reports after finishing medication course,rash returns usually.  -please see pictures taken today.  Please see pictures taken on May 14 for comparison    Plan.  Will start antifungal  Referral dermatology provided

## 2024-06-03 NOTE — TELEPHONE ENCOUNTER
Called and left a message for the patient to give us a maxine back so we could get him scheduled for a hospital follow up on the [facility] clinic for periorbital dermatitis okay by Dr. Dumont

## 2024-06-03 NOTE — TELEPHONE ENCOUNTER
Patient called he was seen in the ER today and  there is a routine referral in his chart for periorbital dermatitis he had it for about a month on the eyes arms and right ear itchy and sometimes bleeds the ER gave him methylprednisolone and mupirocin 2 % ointment, can this go [facility]

## 2024-06-03 NOTE — Clinical Note
Paul Mondragon was seen and treated in our emergency department on 6/3/2024.                Diagnosis:     Paul  may return to work on return date.    He may return on this date: 06/06/2024         If you have any questions or concerns, please don't hesitate to call.      Esha Banks, DO    ______________________________           _______________          _______________  Hospital Representative                              Date                                Time

## 2024-06-03 NOTE — ED PROVIDER NOTES
History  Chief Complaint   Patient presents with    Rash     Rash around both eyes and states that he has some spots on both arms for the past 3 weeks Seen by primary and given a steriod cream, states that the rash has gotten worse since starting the cream      Paul Mondragon is a 24-year-old male with past medical history of allergies, PNES who presents with one month of rashes around both eyes and on his arms, by his right ear.  He can think of no changes in vision, medication, other changes to his schedule that coincided with the rashes. No recent hiking. Rash started on the arms and then rash around the eyes developed.  Was seen in urgent care and then in [place] [facility] a few times for same.  Was given course of Keflex, doxycycline, Medrol Dosepak, topical antifungal treatment. Steroids were the only medicine that helped. Since that time patient feels the rash has gotten worse.  Describes the rash around his eyes as nonpainful, intermittently itchy causing watery eyes with yellowish crust throughout the day. Rashes on his arms are itchy.  History of allergies to dogs, cats, trees, grass. No family history of rheumatologic disease.  No history of contact use. Denies fever, conjunctivitis, changes in vision, pain in eyes, ear pain, sore throat, trouble swallowing, nausea/vomiting.         Prior to Admission Medications   Prescriptions Last Dose Informant Patient Reported? Taking?   NON FORMULARY   Yes No   Sig: Medical marijuana   clotrimazole (LOTRIMIN) 1 % cream   No No   Sig: Apply topically 2 (two) times a day   levalbuterol (Xopenex HFA) 45 mcg/act inhaler   No No   Sig: Inhale 1-2 puffs every 4 (four) hours as needed for wheezing or shortness of breath   methylPREDNISolone 4 MG tablet therapy pack   No No   Sig: Use as directed on package   Patient not taking: Reported on 5/31/2024      Facility-Administered Medications: None       Past Medical History:   Diagnosis Date    ADHD     Asthma     Substance  abuse ([facility]) 2019    crystal meth (last used late 2019)       Past Surgical History:   Procedure Laterality Date    NO PAST SURGERIES      US GUIDED LYMPH NODE BIOPSY LEFT  9/19/2022       Family History   Problem Relation Age of Onset    Hypertension Mother     No Known Problems Father     Heart disease Maternal Grandmother     Heart disease Maternal Grandfather     Breast cancer Maternal Aunt     Thyroid disease Maternal Aunt         x2    Diabetes Maternal Aunt      I have reviewed and agree with the history as documented.    E-Cigarette/Vaping    E-Cigarette Use Former User     Start Date 10/29/17      E-Cigarette/Vaping Substances    Nicotine Yes     THC Yes     CBD No     Flavoring Yes     Other No      Social History     Tobacco Use    Smoking status: Former     Types: Cigarettes     Start date: 10/29/2017     Passive exposure: Current    Smokeless tobacco: Never   Vaping Use    Vaping status: Former    Start date: 10/29/2017    Substances: Nicotine, THC, Flavoring   Substance Use Topics    Alcohol use: Yes     Comment: Occasionally    Drug use: Yes     Types: Marijuana        Review of Systems   Constitutional:  Negative for fever.   HENT:  Negative for ear discharge, ear pain, sinus pressure, sinus pain, sore throat and trouble swallowing.    Eyes:  Positive for discharge (Watery) and redness (Periorbital rash). Negative for pain, itching and visual disturbance.   Respiratory:  Negative for cough and shortness of breath.    Cardiovascular:  Negative for chest pain and leg swelling.   Gastrointestinal:  Negative for abdominal pain, constipation, diarrhea, nausea and vomiting.   Musculoskeletal:  Negative for gait problem and joint swelling.   Skin:  Positive for rash (Arms and periorbital).   Neurological:  Negative for headaches.   Psychiatric/Behavioral:  Negative for self-injury.        Physical Exam  ED Triage Vitals [06/03/24 0938]   Temperature Pulse Respirations Blood Pressure SpO2   98.2 °F (36.8 °C)  (!) 50 18 139/74 95 %      Temp Source Heart Rate Source Patient Position - Orthostatic VS BP Location FiO2 (%)   Temporal Monitor Sitting Right arm --      Pain Score       --             Orthostatic Vital Signs  Vitals:    06/03/24 0938   BP: 139/74   Pulse: (!) 50   Patient Position - Orthostatic VS: Sitting       Physical Exam  Vitals reviewed.   Constitutional:       General: He is not in acute distress.     Appearance: He is normal weight. He is not ill-appearing or toxic-appearing.   HENT:      Head: Normocephalic and atraumatic.      Right Ear: External ear normal.      Left Ear: External ear normal.      Ears:      Comments: Round 2 to 3 cm rash in front of right ear only.  See media for picture     Nose: Nose normal. No congestion or rhinorrhea.   Eyes:      General:         Right eye: No discharge.         Left eye: No discharge.      Extraocular Movements: Extraocular movements intact.      Conjunctiva/sclera: Conjunctivae normal.      Pupils: Pupils are equal, round, and reactive to light.   Cardiovascular:      Rate and Rhythm: Normal rate and regular rhythm.      Heart sounds: Normal heart sounds. No murmur heard.     No friction rub. No gallop.   Pulmonary:      Effort: Pulmonary effort is normal. No respiratory distress.      Breath sounds: Normal breath sounds. No wheezing, rhonchi or rales.   Musculoskeletal:      Right lower leg: No edema.      Left lower leg: No edema.   Skin:     General: Skin is warm and dry.      Coloration: Skin is not jaundiced or pale.      Findings: Rash (See media for periorbital and rash infront of ear) present.   Neurological:      General: No focal deficit present.      Mental Status: He is alert and oriented to person, place, and time.   Psychiatric:         Mood and Affect: Mood normal.         Behavior: Behavior normal.         Thought Content: Thought content normal.         ED Medications  Medications - No data to display    Diagnostic Studies  Results Reviewed        None                   No orders to display         Procedures  Procedures      ED Course                             SBIRT 22yo+      Flowsheet Row Most Recent Value   Initial Alcohol Screen: US AUDIT-C     1. How often do you have a drink containing alcohol? 0 Filed at: 06/03/2024 0940   2. How many drinks containing alcohol do you have on a typical day you are drinking?  0 Filed at: 06/03/2024 0940   3a. Male UNDER 65: How often do you have five or more drinks on one occasion? 0 Filed at: 06/03/2024 0940   Audit-C Score 0 Filed at: 06/03/2024 0940   ROD: How many times in the past year have you...    Used an illegal drug or used a prescription medication for non-medical reasons? Never Filed at: 06/03/2024 0940                  Medical Decision Making  24-year-old male with past medical history of allergies, PNES who presents with one month of rashes around both eyes and on his arms, by his right ear. Differentials include, but not limited to, atopic dermatitis, allergies, rheumatologic issues, infectious pathology. Given history of allergies as well as itching of the eyes and watery discharge possible that this is atopic dermatitis or allergic process, like rosacea. Low suspicion for infectious pathology as patient has had no history of fever and rash does not improve with multiple antibiotics. Will prescribe topical antibiotic for MRSA coverage. Low suspicion of rheumatologic disease given no family history and no joint pains or other systemic symptoms, but this more appropriately be worked up outpatient as patient is stable. Patient written for course of steroids and referral to dermatology for outpatient. Instructed to stop antifungal topical treatment. Discharged home with return precautions.     Problems Addressed:  Periorbital dermatitis: acute illness or injury     Details: 1 month    Amount and/or Complexity of Data Reviewed  Independent Historian: parent     Details: Discussed with mother at  bedside   External Data Reviewed: labs and notes.    Risk  OTC drugs.  Prescription drug management.  Risk Details: Treated patient for periorbital dermatitis  Low suspicion for periorbital cellulitis, necrotizing fasciitis  Medrol Dosepak, topical antibiotic ordered  Referral to dermatology ordered            Disposition  Final diagnoses:   Periorbital dermatitis     Time reflects when diagnosis was documented in both MDM as applicable and the Disposition within this note       Time User Action Codes Description Comment    6/3/2024 10:15 AM Esha Banks Add [L30.9] Periorbital dermatitis           ED Disposition       ED Disposition   Discharge    Condition   Stable    Date/Time   Mon Andres 3, 2024 1014    Comment   Paul Hemalvan discharge to home/self care.                   Follow-up Information       Follow up With Specialties Details Why Contact Info Additional Information    Ghazala Mcnulty PA-C Family Medicine, Physician Assistant Schedule an appointment as soon as possible for a visit   [address] [facility] [address]  [phone]       [facility] Schedule an appointment as soon as possible for a visit   [facility] [address]  [phone] [address], [address]     [phone]            Patient's Medications   Discharge Prescriptions    METHYLPREDNISOLONE 4 MG TABLET THERAPY PACK    Use as directed on package       Start Date: 6/3/2024  End Date: --       Order Dose: --       Quantity: 21 tablet    Refills: 0    MUPIROCIN (BACTROBAN) 2 % OINTMENT    Apply topically 3 (three) times a day       Start Date: 6/3/2024  End Date: --       Order Dose: --       Quantity: 15 g    Refills: 0         PDMP Review       None             ED Provider  Attending physically available and evaluated Paul Hemalvan. I managed the patient along with the ED Attending.    Electronically Signed  by       Esha Banks DO  06/03/24 1044

## 2024-06-03 NOTE — ED ATTENDING ATTESTATION
6/3/2024  I, Bobby Marcano DO, saw and evaluated the patient. I have discussed the patient with the resident/non-physician practitioner and agree with the resident's/non-physician practitioner's findings, Plan of Care, and MDM as documented in the resident's/non-physician practitioner's note, except where noted. All available labs and Radiology studies were reviewed.  I was present for key portions of any procedure(s) performed by the resident/non-physician practitioner and I was immediately available to provide assistance.       At this point I agree with the current assessment done in the Emergency Department.  I have conducted an independent evaluation of this patient a history and physical is as follows:    ED Course     Patient is a pleasant 24-year-old male complaining of periorbital rash and rash on the right side of his ear, TMJ region.  Has been present intermittently x 1 month.  Patient has been treated at urgent care facility and as well by his primary care physician has been treated with antibiotics, steroids, clotrimazole cream.  Rash is consistent with periorbital papular dermatitis.  Will give additional steroids, Bactroban ointment, dermatology referral, work note.  Return if worsens.  Does not wear contacts.  No recent travel.  Not immunocompromised.  No visual changes or ocular involvement.    Critical Care Time  Procedures

## 2024-06-10 ENCOUNTER — TELEMEDICINE (OUTPATIENT)
Dept: FAMILY MEDICINE CLINIC | Facility: CLINIC | Age: 24
End: 2024-06-10
Payer: COMMERCIAL

## 2024-06-10 DIAGNOSIS — L30.9 PERIORBITAL DERMATITIS: Primary | ICD-10-CM

## 2024-06-10 PROCEDURE — 99213 OFFICE O/P EST LOW 20 MIN: CPT | Performed by: FAMILY MEDICINE

## 2024-06-10 RX ORDER — PREDNISONE 10 MG/1
10 TABLET ORAL DAILY
Qty: 14 TABLET | Refills: 0 | Status: SHIPPED | OUTPATIENT
Start: 2024-06-10

## 2024-06-10 NOTE — PROGRESS NOTES
Virtual Regular Visit    Verification of patient location:    Patient is located at Home in the following state in which I hold an active license [place]      Assessment/Plan:    Problem List Items Addressed This Visit    None  Visit Diagnoses       Periorbital dermatitis    -  Primary    Relevant Medications    predniSONE 10 mg tablet    Other Relevant Orders    Ambulatory Referral to Dermatology          Information provided for [facility] in [place]  Will try low dose prednisone until he can est with derm. Mupirocin as ordered  FUP 1 weeek as scheduled          Reason for visit is   Chief Complaint   Patient presents with    Dermatitis    Virtual Regular Visit        Encounter provider Ghazala Mcnulty PA-C      Recent Visits  No visits were found meeting these conditions.  Showing recent visits within past 7 days and meeting all other requirements  Today's Visits  Date Type Provider Dept   06/10/24 Telemedicine Ghazala Mcnulty PA-C [facility]   Showing today's visits and meeting all other requirements  Future Appointments  No visits were found meeting these conditions.  Showing future appointments within next 150 days and meeting all other requirements       The patient was identified by name and date of birth. Paul Mondragon was informed that this is a telemedicine visit and that the visit is being conducted through the Epic Embedded platform. He agrees to proceed..  My office door was closed. No one else was in the room.  He acknowledged consent and understanding of privacy and security of the video platform. The patient has agreed to participate and understands they can discontinue the visit at any time.    Patient is aware this is a billable service.     Subjective  Paul Mondragon is a 24 y.o. male presents for periorbital dermatitis. Long standing history of such.he is awaiting evaluation by derm. It seems to only be respndong to oral steroids. When he completed steroid pack sx return. He  is unable to work when they are flared. Using mupirocin with some relief. No changes in vision       HPI     Past Medical History:   Diagnosis Date    ADHD     Asthma     Substance abuse ([facility]) 2019    crystal meth (last used late 2019)       Past Surgical History:   Procedure Laterality Date    NO PAST SURGERIES      US GUIDED LYMPH NODE BIOPSY LEFT  9/19/2022       Current Outpatient Medications   Medication Sig Dispense Refill    levalbuterol (Xopenex HFA) 45 mcg/act inhaler Inhale 1-2 puffs every 4 (four) hours as needed for wheezing or shortness of breath 15 g 2    mupirocin (BACTROBAN) 2 % ointment Apply topically 3 (three) times a day 15 g 0    NON FORMULARY Medical marijuana      predniSONE 10 mg tablet Take 1 tablet (10 mg total) by mouth daily With food 14 tablet 0     No current facility-administered medications for this visit.        Allergies   Allergen Reactions    Bee Pollen     Cat Hair Extract Sneezing    Dog Epithelium     Pollen Extract     David Grass Pollen Allergen     Tree Extract        Review of Systems   HENT: Negative.     Eyes:  Positive for itching. Negative for redness and visual disturbance.   Skin:  Positive for rash.       Video Exam    There were no vitals filed for this visit.    Physical Exam  Constitutional:       Appearance: Normal appearance.   HENT:      Head: Normocephalic and atraumatic.   Neurological:      Mental Status: He is alert.          Visit Time  Total Visit Duration: 15

## 2024-06-17 ENCOUNTER — PATIENT OUTREACH (OUTPATIENT)
Dept: FAMILY MEDICINE CLINIC | Facility: HOME HEALTHCARE | Age: 24
End: 2024-06-17

## 2024-06-17 ENCOUNTER — OFFICE VISIT (OUTPATIENT)
Dept: FAMILY MEDICINE CLINIC | Facility: HOME HEALTHCARE | Age: 24
End: 2024-06-17
Payer: COMMERCIAL

## 2024-06-17 ENCOUNTER — TELEPHONE (OUTPATIENT)
Age: 24
End: 2024-06-17

## 2024-06-17 VITALS
HEART RATE: 78 BPM | DIASTOLIC BLOOD PRESSURE: 83 MMHG | RESPIRATION RATE: 18 BRPM | BODY MASS INDEX: 22.32 KG/M2 | WEIGHT: 160 LBS | OXYGEN SATURATION: 98 % | TEMPERATURE: 98 F | SYSTOLIC BLOOD PRESSURE: 124 MMHG

## 2024-06-17 DIAGNOSIS — Z74.8 ASSISTANCE NEEDED WITH TRANSPORTATION: ICD-10-CM

## 2024-06-17 DIAGNOSIS — L30.9 PERIORBITAL DERMATITIS: Primary | ICD-10-CM

## 2024-06-17 PROCEDURE — T1015 CLINIC SERVICE: HCPCS | Performed by: FAMILY MEDICINE

## 2024-06-17 NOTE — PROGRESS NOTES
Ambulatory Visit  Name: Paul Mondragon      : 2000      MRN: [id number]  Encounter Provider: Sandy Mcpherson MD  Encounter Date: 2024   Encounter department: [facility]    Assessment & Plan   1. Periorbital dermatitis  Assessment & Plan:  Rash for almost 2 mo unclear etiology.  Was seen by urgent care May 1 given Keflex 500 mg and Bactroban.  -Was seen by us May 14 given prednisone for 10 days  -Was seen as a follow-up by us on May 23 given prednisone and doxycycline 100 mg capsule twice a day for 7 days.  -Initially rash on arm leg and around eye   -Currently rash only around eyes, on arms please see picture below not itchy no discharge no new medications no over-the-counter medications no new lotions no history of allergy  -No vision change no prior history of rash.  -Patient reports after finishing medication course,rash returns usually.  -Please see pictures taken on May 14 and May 31 for comparison  -Patient tried antifungal on May 31 reports that if made the rash worse after that he was seen in ER given Bactroban  -Telemedicine appointment on Kimberly 10 given again steroids for 14 days 10 mg  -Stat referral placed for dermatology  -If patient cannot schedule appointment with dermatology and he will be back for punch biopsy with us  -Ordered autoimmune workup today    Orders:  -     Ambulatory Referral to Dermatology; Future  -     DIANNA Screen w/ Reflex to Titer/Pattern; Future  -     Sedimentation rate, automated; Future  -     C-reactive protein; Future  -     Comprehensive metabolic panel; Future  -     CBC and differential; Future  -     Lupus anticoagulant; Future  -     TSH, 3rd generation; Future  -     DIANNA Screen w/ Reflex to Titer/Pattern  -     Sedimentation rate, automated  -     C-reactive protein  -     Comprehensive metabolic panel  -     CBC and differential  -     Lupus anticoagulant  -     TSH, 3rd generation  -     C3 complement; Future  -     C4  complement; Future  -     Anti-neutrophilic cytoplasmic antibody; Future  -     C3 complement  -     C4 complement  -     Anti-neutrophilic cytoplasmic antibody  2. Assistance needed with transportation  -     Ambulatory Referral to Social Work Care Management Program; Future       History of Present Illness     HPI  Patient is here for follow-up regarding periorbital rash.  Rash for almost a month unclear etiology.  Was seen by urgent care May 1 given Keflex 500 mg and Bactroban.  Was seen by us May 14 given prednisone for 10 days.  Was seen as a follow-up by us on May 23 given prednisone and doxycycline 100 mg capsule twice a day for 7 days.initially rash on arm leg and around eye .  Currently rash only around eyes, on arms please see picture below not itchy no discharge no new medications no over-the-counter medications no new lotions no history of allergy.   No vision change no prior history of rash.  Patient reports after finishing medication course,rash returns usually.  Please see pictures taken on May 14 and May 31 for comparison  Patient tried antifungal on May 31 reports that if made the rash worse after that he was seen in ER given Bactroban  Pt had Telemedicine appointment on Kimberly 10 given again steroids for 14 days 10 mg  Stat referral placed for dermatology  if patient cannot schedule appointment with dermatology and he will be back for punch biopsy with us  Ordered autoimmune workup today  Review of Systems   Constitutional:  Negative for chills and fever.   HENT:  Negative for ear pain and sore throat.    Eyes:  Negative for pain and visual disturbance.   Respiratory:  Negative for cough and shortness of breath.    Cardiovascular:  Negative for chest pain and palpitations.   Gastrointestinal:  Negative for abdominal pain and vomiting.   Genitourinary:  Negative for dysuria and hematuria.   Musculoskeletal:  Negative for arthralgias and back pain.   Skin:  Positive for rash. Negative for color change.    Neurological:  Negative for seizures and syncope.   All other systems reviewed and are negative.    Medical History Reviewed by provider this encounter:       Current Outpatient Medications on File Prior to Visit   Medication Sig Dispense Refill    levalbuterol (Xopenex HFA) 45 mcg/act inhaler Inhale 1-2 puffs every 4 (four) hours as needed for wheezing or shortness of breath 15 g 2    mupirocin (BACTROBAN) 2 % ointment Apply topically 3 (three) times a day 15 g 0    NON FORMULARY Medical marijuana      predniSONE 10 mg tablet Take 1 tablet (10 mg total) by mouth daily With food 14 tablet 0     No current facility-administered medications on file prior to visit.      Social History     Tobacco Use    Smoking status: Former     Types: Cigarettes     Start date: 10/29/2017     Passive exposure: Current    Smokeless tobacco: Never   Vaping Use    Vaping status: Former    Start date: 10/29/2017    Substances: Nicotine, THC, Flavoring   Substance and Sexual Activity    Alcohol use: Yes     Comment: Occasionally    Drug use: Yes     Types: Marijuana    Sexual activity: Not Currently     Objective     /83   Pulse 78   Temp 98 °F (36.7 °C) (Tympanic)   Resp 18   Wt 72.6 kg (160 lb)   SpO2 98%   BMI 22.32 kg/m²     Physical Exam  Vitals and nursing note reviewed.   Constitutional:       General: He is not in acute distress.     Appearance: He is well-developed.   HENT:      Head: Normocephalic and atraumatic.   Eyes:      Conjunctiva/sclera: Conjunctivae normal.   Cardiovascular:      Rate and Rhythm: Normal rate and regular rhythm.      Pulses: Normal pulses.      Heart sounds: Normal heart sounds. No murmur heard.  Pulmonary:      Effort: Pulmonary effort is normal. No respiratory distress.      Breath sounds: Normal breath sounds.   Abdominal:      Palpations: Abdomen is soft.      Tenderness: There is no abdominal tenderness.   Musculoskeletal:         General: No swelling.      Cervical back: Neck supple.    Skin:     General: Skin is warm and dry.      Capillary Refill: Capillary refill takes less than 2 seconds.      Findings: Rash (See picture below .  Periorbital rash bilaterally and rash on arms) present.   Neurological:      General: No focal deficit present.      Mental Status: He is alert and oriented to person, place, and time.   Psychiatric:         Mood and Affect: Mood normal.         Behavior: Behavior normal.         Thought Content: Thought content normal.         Judgment: Judgment normal.                 Administrative Statements   I have spent a total time of 25 minutes on 06/17/24 In caring for this patient including Prognosis, Instructions for management, Patient and family education, Documenting in the medical record, Reviewing / ordering tests, medicine, procedures  , and Obtaining or reviewing history  .

## 2024-06-17 NOTE — ASSESSMENT & PLAN NOTE
Rash for almost 2 mo unclear etiology.  Was seen by urgent care May 1 given Keflex 500 mg and Bactroban.  -Was seen by us May 14 given prednisone for 10 days  -Was seen as a follow-up by us on May 23 given prednisone and doxycycline 100 mg capsule twice a day for 7 days.  -Initially rash on arm leg and around eye   -Currently rash only around eyes, on arms please see picture below not itchy no discharge no new medications no over-the-counter medications no new lotions no history of allergy  -No vision change no prior history of rash.  -Patient reports after finishing medication course,rash returns usually.  -Please see pictures taken on May 14 and May 31 for comparison  -Patient tried antifungal on May 31 reports that if made the rash worse after that he was seen in ER given Bactroban  -Telemedicine appointment on Kimberly 10 given again steroids for 14 days 10 mg  -Stat referral placed for dermatology  -If patient cannot schedule appointment with dermatology and he will be back for punch biopsy with us  -Ordered autoimmune workup today

## 2024-06-17 NOTE — TELEPHONE ENCOUNTER
Called and left a message for the patient to give us a call back so we could get him scheduled in the next available [facility] follow up slot okay by Dr. Dumont for PERIORBITAL DERMATITIS

## 2024-06-17 NOTE — PROGRESS NOTES
SAILAJA RADER received referral for patient from Sandy Mcpherson MD for assistance needed with transportation. Chart reviewed. There is no previous OP CM outreach.    SAILAJA RADER placed initial outreach call to patient and left a voicemail. SAILAJA CM to place second outreach call within one week If return call is not received prior.

## 2024-06-17 NOTE — TELEPHONE ENCOUNTER
Rec'd call from Carlyle at PCP office calling to schedule appt for STAT referral.    RE-CURRENT PERIORBITAL DERMATITIS    I told her that I would send triage to our clinical POD and either they or I would return call  to patient.    Carlyle verbalized understanding.    POD: Permission to schedule patient in [facility]?

## 2024-06-18 ENCOUNTER — TELEPHONE (OUTPATIENT)
Age: 24
End: 2024-06-18

## 2024-06-18 NOTE — TELEPHONE ENCOUNTER
Called and left a message for the patient to call back we can get him scheduled on the next available slot [facility] for hospital follow up ASAP referral periorbital dermatitis okay Dr. Dumont, have been trying to reach this patient to get him scheduled

## 2024-06-19 ENCOUNTER — APPOINTMENT (OUTPATIENT)
Dept: LAB | Facility: CLINIC | Age: 24
End: 2024-06-19
Payer: COMMERCIAL

## 2024-06-19 ENCOUNTER — PATIENT OUTREACH (OUTPATIENT)
Dept: FAMILY MEDICINE CLINIC | Facility: HOME HEALTHCARE | Age: 24
End: 2024-06-19

## 2024-06-19 DIAGNOSIS — L30.9 PERIORBITAL DERMATITIS: ICD-10-CM

## 2024-06-19 LAB
ALBUMIN SERPL BCG-MCNC: 4.4 G/DL (ref 3.5–5)
ALP SERPL-CCNC: 57 U/L (ref 34–104)
ALT SERPL W P-5'-P-CCNC: 20 U/L (ref 7–52)
ANA SER QL IA: NEGATIVE
ANION GAP SERPL CALCULATED.3IONS-SCNC: 6 MMOL/L (ref 4–13)
AST SERPL W P-5'-P-CCNC: 13 U/L (ref 13–39)
BASOPHILS # BLD AUTO: 0.02 THOUSANDS/ÂΜL (ref 0–0.1)
BASOPHILS NFR BLD AUTO: 0 % (ref 0–1)
BILIRUB SERPL-MCNC: 0.52 MG/DL (ref 0.2–1)
BUN SERPL-MCNC: 15 MG/DL (ref 5–25)
C3 SERPL-MCNC: 114 MG/DL (ref 87–200)
C4 SERPL-MCNC: 46 MG/DL (ref 19–52)
CALCIUM SERPL-MCNC: 8.8 MG/DL (ref 8.4–10.2)
CHLORIDE SERPL-SCNC: 104 MMOL/L (ref 96–108)
CO2 SERPL-SCNC: 30 MMOL/L (ref 21–32)
CREAT SERPL-MCNC: 0.91 MG/DL (ref 0.6–1.3)
CRP SERPL QL: 2 MG/L
EOSINOPHIL # BLD AUTO: 0.17 THOUSAND/ÂΜL (ref 0–0.61)
EOSINOPHIL NFR BLD AUTO: 3 % (ref 0–6)
ERYTHROCYTE [DISTWIDTH] IN BLOOD BY AUTOMATED COUNT: 12.4 % (ref 11.6–15.1)
ERYTHROCYTE [SEDIMENTATION RATE] IN BLOOD: 6 MM/HOUR (ref 0–14)
GFR SERPL CREATININE-BSD FRML MDRD: 117 ML/MIN/1.73SQ M
GLUCOSE P FAST SERPL-MCNC: 107 MG/DL (ref 65–99)
HCT VFR BLD AUTO: 44.2 % (ref 36.5–49.3)
HGB BLD-MCNC: 14.6 G/DL (ref 12–17)
IMM GRANULOCYTES # BLD AUTO: 0.03 THOUSAND/UL (ref 0–0.2)
IMM GRANULOCYTES NFR BLD AUTO: 1 % (ref 0–2)
LYMPHOCYTES # BLD AUTO: 1.17 THOUSANDS/ÂΜL (ref 0.6–4.47)
LYMPHOCYTES NFR BLD AUTO: 24 % (ref 14–44)
MCH RBC QN AUTO: 30.9 PG (ref 26.8–34.3)
MCHC RBC AUTO-ENTMCNC: 33 G/DL (ref 31.4–37.4)
MCV RBC AUTO: 93 FL (ref 82–98)
MONOCYTES # BLD AUTO: 0.65 THOUSAND/ÂΜL (ref 0.17–1.22)
MONOCYTES NFR BLD AUTO: 13 % (ref 4–12)
NEUTROPHILS # BLD AUTO: 2.89 THOUSANDS/ÂΜL (ref 1.85–7.62)
NEUTS SEG NFR BLD AUTO: 59 % (ref 43–75)
NRBC BLD AUTO-RTO: 0 /100 WBCS
PLATELET # BLD AUTO: 237 THOUSANDS/UL (ref 149–390)
PMV BLD AUTO: 11.1 FL (ref 8.9–12.7)
POTASSIUM SERPL-SCNC: 4.2 MMOL/L (ref 3.5–5.3)
PROT SERPL-MCNC: 6.8 G/DL (ref 6.4–8.4)
RBC # BLD AUTO: 4.73 MILLION/UL (ref 3.88–5.62)
SODIUM SERPL-SCNC: 140 MMOL/L (ref 135–147)
TSH SERPL DL<=0.05 MIU/L-ACNC: 1.05 UIU/ML (ref 0.45–4.5)
WBC # BLD AUTO: 4.93 THOUSAND/UL (ref 4.31–10.16)

## 2024-06-19 PROCEDURE — 85732 THROMBOPLASTIN TIME PARTIAL: CPT

## 2024-06-19 PROCEDURE — 85652 RBC SED RATE AUTOMATED: CPT

## 2024-06-19 PROCEDURE — 86038 ANTINUCLEAR ANTIBODIES: CPT

## 2024-06-19 PROCEDURE — 86140 C-REACTIVE PROTEIN: CPT

## 2024-06-19 PROCEDURE — 85705 THROMBOPLASTIN INHIBITION: CPT

## 2024-06-19 PROCEDURE — 85613 RUSSELL VIPER VENOM DILUTED: CPT

## 2024-06-19 PROCEDURE — 86037 ANCA TITER EACH ANTIBODY: CPT

## 2024-06-19 PROCEDURE — 80053 COMPREHEN METABOLIC PANEL: CPT

## 2024-06-19 PROCEDURE — 85025 COMPLETE CBC W/AUTO DIFF WBC: CPT

## 2024-06-19 PROCEDURE — 86160 COMPLEMENT ANTIGEN: CPT

## 2024-06-19 PROCEDURE — 84443 ASSAY THYROID STIM HORMONE: CPT

## 2024-06-19 PROCEDURE — 36415 COLL VENOUS BLD VENIPUNCTURE: CPT

## 2024-06-19 PROCEDURE — 85670 THROMBIN TIME PLASMA: CPT

## 2024-06-19 PROCEDURE — 83520 IMMUNOASSAY QUANT NOS NONAB: CPT

## 2024-06-19 NOTE — PROGRESS NOTES
SAILAJA RADER placed second outreach call to patient and left a voicemail. SAILAJA RADER sent Unable to Reach letter to patient's MyChart.    SAILAJA RADER to close referral due to unable to make contact.

## 2024-06-19 NOTE — LETTER
06/19/24    Dear Paul Mondragon,    I am a Care Manager with [facility]  [place] [address]  [phone].  We have made several attempts to call you by phone.  It is important that you contact us back at [phone] so that we can assist with your care needs.     Sincerely,         Astrid Parham  Outpatient Social Work Care Manager

## 2024-06-20 LAB
APTT SCREEN TO CONFIRM RATIO: 1.15 RATIO (ref 0–1.34)
CONFIRM APTT/NORMAL: 36.4 SEC (ref 0–47.6)
LA PPP-IMP: NORMAL
SCREEN APTT: 39.2 SEC (ref 0–43.5)
SCREEN DRVVT: 39.6 SEC (ref 0–47)
THROMBIN TIME: 18.8 SEC (ref 0–23)

## 2024-06-24 ENCOUNTER — OFFICE VISIT (OUTPATIENT)
Dept: FAMILY MEDICINE CLINIC | Facility: HOME HEALTHCARE | Age: 24
End: 2024-06-24
Payer: COMMERCIAL

## 2024-06-24 VITALS
RESPIRATION RATE: 18 BRPM | BODY MASS INDEX: 22.76 KG/M2 | TEMPERATURE: 98.5 F | OXYGEN SATURATION: 99 % | WEIGHT: 162.6 LBS | HEIGHT: 71 IN | HEART RATE: 73 BPM | DIASTOLIC BLOOD PRESSURE: 73 MMHG | SYSTOLIC BLOOD PRESSURE: 114 MMHG

## 2024-06-24 DIAGNOSIS — L30.9 PERIORBITAL DERMATITIS: Primary | ICD-10-CM

## 2024-06-24 LAB
C-ANCA TITR SER IF: NORMAL TITER
MYELOPEROXIDASE AB SER IA-ACNC: <0.2 UNITS (ref 0–0.9)
P-ANCA ATYPICAL TITR SER IF: NORMAL TITER
P-ANCA TITR SER IF: NORMAL TITER
PROTEINASE3 AB SER IA-ACNC: <0.2 UNITS (ref 0–0.9)

## 2024-06-24 PROCEDURE — T1015 CLINIC SERVICE: HCPCS | Performed by: FAMILY MEDICINE

## 2024-06-24 NOTE — TELEPHONE ENCOUNTER
Call received from PCP's office , appointment made on 07/30 they requested me to reach out to Dr. Dumont and ask if he can be seen sooner because his right eye is bleeding , denied signs of infection

## 2024-06-25 NOTE — PROGRESS NOTES
Ambulatory Visit  Name: Paul Mondragon      : 2000      MRN: [id number]  Encounter Provider: Sandy Mcpherson MD  Encounter Date: 2024   Encounter department: [facility]    Assessment & Plan   1. Periorbital dermatitis  Assessment & Plan:  Rash for almost 2 mo unclear etiology.  Was seen by urgent care May 1 given Keflex 500 mg and Bactroban.  -Was seen by us May 14 given prednisone for 10 days  -Was seen as a follow-up by us on May 23 given prednisone and doxycycline 100 mg capsule twice a day for 7 days.  -Initially rash on arm leg and around eye   -Currently rash only around eyes, on arms please see picture below not itchy no discharge no new medications no over-the-counter medications no new lotions no history of allergy  -No vision change no prior history of rash.  -Patient reports after finishing medication course,rash returns usually.  -Please see pictures taken on May 14 and May 31 for comparison  -Patient tried antifungal on May 31 reports that if made the rash worse after that he was seen in ER given Bactroban  -Telemedicine appointment on Kimberly 10 given again steroids for 14 days 10 mg  - referral placed for dermatology  -No family history of connective tissue disorders.  Or autoimmune disorders.  - autoimmune workup today, C-reactive protein and sed rate, DIANNA, C4 C3 complement, ANCN, CBC, CMP, lupus antibody within normal limits  -Patient is not sexually active for almost 4 yr, no concern of sexually transmitted disease  -Has appointment with Derm on .  -Rash significantly improved after steroid use for 10 days, informed patient if rash gets worse then he will be seen by us on Friday to do punch biopsy         History of Present Illness     Rash  Pertinent negatives include no cough, fever, shortness of breath, sore throat or vomiting.       Patient is here to follow-up regarding rash.  He was seen by us multiple times regarding rash, unclear  etiology.  Patient was on multiple medications antibiotics steroids another cycle of antibiotics and steroids again.  He tried antifungal which made rash worse.  Additionally we did autoimmune workup which came back within normal limits.  Patient denies any new sexual partners.  He has not been sexually active for almost 4 years no concern of sexually transmitted diseases no family history of connective tissue disorder.    Patient just completed his steroid course last tablet took today AM.  His rash significantly improved.  He does have appointment July 31 with dermatology.  Recommended him to come back and to be seen by us if the rash gets worse then we will do punch biopsy.  Will schedule appointment on Friday for possible punch biopsy.    Review of Systems   Constitutional:  Negative for chills and fever.   HENT:  Negative for ear pain and sore throat.    Eyes:  Negative for pain and visual disturbance.   Respiratory:  Negative for cough and shortness of breath.    Cardiovascular:  Negative for chest pain and palpitations.   Gastrointestinal:  Negative for abdominal pain and vomiting.   Genitourinary:  Negative for dysuria and hematuria.   Musculoskeletal:  Negative for arthralgias and back pain.   Skin:  Positive for rash. Negative for color change.   Neurological:  Negative for seizures and syncope.   All other systems reviewed and are negative.    Medical History Reviewed by provider this encounter:       Current Outpatient Medications on File Prior to Visit   Medication Sig Dispense Refill    levalbuterol (Xopenex HFA) 45 mcg/act inhaler Inhale 1-2 puffs every 4 (four) hours as needed for wheezing or shortness of breath 15 g 2    NON FORMULARY Medical marijuana      mupirocin (BACTROBAN) 2 % ointment Apply topically 3 (three) times a day (Patient not taking: Reported on 6/24/2024) 15 g 0    predniSONE 10 mg tablet Take 1 tablet (10 mg total) by mouth daily With food (Patient not taking: Reported on  "6/24/2024) 14 tablet 0     No current facility-administered medications on file prior to visit.      Social History     Tobacco Use    Smoking status: Former     Types: Cigarettes     Start date: 10/29/2017     Passive exposure: Current    Smokeless tobacco: Never   Vaping Use    Vaping status: Former    Start date: 10/29/2017    Substances: Nicotine, THC, Flavoring   Substance and Sexual Activity    Alcohol use: Yes     Comment: Occasionally    Drug use: Yes     Types: Marijuana    Sexual activity: Not Currently     Objective     /73   Pulse 73   Temp 98.5 °F (36.9 °C)   Resp 18   Ht 5' 11\" (1.803 m)   Wt 73.8 kg (162 lb 9.6 oz)   SpO2 99%   BMI 22.68 kg/m²     Physical Exam  Vitals and nursing note reviewed.   Constitutional:       General: He is not in acute distress.     Appearance: He is well-developed. He is not toxic-appearing.   HENT:      Head: Normocephalic and atraumatic.   Eyes:      Conjunctiva/sclera: Conjunctivae normal.   Cardiovascular:      Rate and Rhythm: Normal rate and regular rhythm.      Pulses: Normal pulses.      Heart sounds: Normal heart sounds.   Pulmonary:      Effort: Pulmonary effort is normal. No respiratory distress.      Breath sounds: Normal breath sounds.   Musculoskeletal:         General: No swelling.      Cervical back: Neck supple.   Skin:     General: Skin is warm and dry.      Capillary Refill: Capillary refill takes less than 2 seconds.      Findings: Rash (Significantly improved compared to prior after steroid use.  Please see picture below) present.   Neurological:      Mental Status: He is alert.   Psychiatric:         Mood and Affect: Mood normal.         Behavior: Behavior normal.         Thought Content: Thought content normal.         Judgment: Judgment normal.                         Administrative Statements   I have spent a total time of 20 minutes on 06/25/24 In caring for this patient including Risks and benefits of tx options, Importance of tx " compliance, Impressions, Counseling / Coordination of care, Reviewing / ordering tests, medicine, procedures  , and Obtaining or reviewing history  .

## 2024-06-25 NOTE — ASSESSMENT & PLAN NOTE
Rash for almost 2 mo unclear etiology.  Was seen by urgent care May 1 given Keflex 500 mg and Bactroban.  -Was seen by us May 14 given prednisone for 10 days  -Was seen as a follow-up by us on May 23 given prednisone and doxycycline 100 mg capsule twice a day for 7 days.  -Initially rash on arm leg and around eye   -Currently rash only around eyes, on arms please see picture below not itchy no discharge no new medications no over-the-counter medications no new lotions no history of allergy  -No vision change no prior history of rash.  -Patient reports after finishing medication course,rash returns usually.  -Please see pictures taken on May 14 and May 31 for comparison  -Patient tried antifungal on May 31 reports that if made the rash worse after that he was seen in ER given Bactroban  -Telemedicine appointment on Kimberly 10 given again steroids for 14 days 10 mg  - referral placed for dermatology  -No family history of connective tissue disorders.  Or autoimmune disorders.  - autoimmune workup today, C-reactive protein and sed rate, DIANNA, C4 C3 complement, ANCN, CBC, CMP, lupus antibody within normal limits  -Patient is not sexually active for almost 4 yr, no concern of sexually transmitted disease  -Has appointment with Derm on July 30.  -Rash significantly improved after steroid use for 10 days, informed patient if rash gets worse then he will be seen by us on Friday to do punch biopsy

## 2024-06-27 ENCOUNTER — OFFICE VISIT (OUTPATIENT)
Dept: FAMILY MEDICINE CLINIC | Facility: HOME HEALTHCARE | Age: 24
End: 2024-06-27
Payer: COMMERCIAL

## 2024-06-27 VITALS
HEART RATE: 69 BPM | OXYGEN SATURATION: 98 % | BODY MASS INDEX: 22.59 KG/M2 | DIASTOLIC BLOOD PRESSURE: 78 MMHG | TEMPERATURE: 98 F | SYSTOLIC BLOOD PRESSURE: 128 MMHG | RESPIRATION RATE: 18 BRPM | WEIGHT: 162 LBS

## 2024-06-27 DIAGNOSIS — R21 RASH: Primary | ICD-10-CM

## 2024-06-27 DIAGNOSIS — L30.9 PERIORBITAL DERMATITIS: ICD-10-CM

## 2024-06-27 DIAGNOSIS — R21 RASH: ICD-10-CM

## 2024-06-27 PROCEDURE — T1015 CLINIC SERVICE: HCPCS | Performed by: FAMILY MEDICINE

## 2024-06-27 PROCEDURE — 88312 SPECIAL STAINS GROUP 1: CPT | Performed by: PATHOLOGY

## 2024-06-27 PROCEDURE — 88305 TISSUE EXAM BY PATHOLOGIST: CPT | Performed by: PATHOLOGY

## 2024-06-27 RX ORDER — LIDOCAINE HYDROCHLORIDE AND EPINEPHRINE 10; 10 MG/ML; UG/ML
2 INJECTION, SOLUTION INFILTRATION; PERINEURAL ONCE
Status: COMPLETED | OUTPATIENT
Start: 2024-06-27 | End: 2024-06-27

## 2024-06-27 RX ORDER — PREDNISONE 10 MG/1
10 TABLET ORAL DAILY
Qty: 5 TABLET | Refills: 0 | Status: SHIPPED | OUTPATIENT
Start: 2024-06-27 | End: 2024-07-02

## 2024-06-27 RX ADMIN — LIDOCAINE HYDROCHLORIDE AND EPINEPHRINE 2 ML: 10; 10 INJECTION, SOLUTION INFILTRATION; PERINEURAL at 09:20

## 2024-06-27 NOTE — ASSESSMENT & PLAN NOTE
Rash for almost 2 mo unclear etiology.  Was seen by urgent care May 1 given Keflex 500 mg and Bactroban.  -Was seen by us May 14 given prednisone for 10 days  -Was seen as a follow-up by us on May 23 given prednisone and doxycycline 100 mg capsule twice a day for 7 days.  -Initially rash on arm leg and around eye   -Currently rash only around eyes, on arms please see picture below not itchy no discharge no new medications no over-the-counter medications no new lotions no history of allergy  -No vision change no prior history of rash.  -Patient reports after finishing medication course,rash returns usually.  -Please see pictures taken on May 14 and May 31 for comparison  -Patient tried antifungal on May 31 reports that if made the rash worse after that he was seen in ER given Bactroban  -Telemedicine appointment on Kimberly 10 given again steroids for 14 days 10 mg  - referral placed for dermatology  -No family history of connective tissue disorders.  Or autoimmune disorders.  - autoimmune workup today, C-reactive protein and sed rate, DIANNA, C4 C3 complement, ANCN, CBC, CMP, lupus antibody within normal limits  -Patient is not sexually active for almost 4 yr, no concern of sexually transmitted disease  -Has appointment with Derm on July 30.  -Rash significantly improved after steroid use for 10 days, patient was seen by me on June 24.    -Rash was significantly improved on June 24 after steroid p.o. use.    -Today June 27 his rash is worse compared to prior.    -He does have appointment with dermatology on July 10   -We will do punch biopsy on his left arm   -Gave topical 2.5% hydrocortisone for rash on arms   -Gave also p.o. steroids in case if the topical does not help   -Patient tolerated well punch biopsy on left arm  -Consent form signed and scanned into chart

## 2024-06-27 NOTE — PROGRESS NOTES
Ambulatory Visit  Name: Paul Mondragon      : 2000      MRN: [id number]  Encounter Provider: Sandy Mcpherson MD  Encounter Date: 2024   Encounter department: [facility]    Assessment & Plan   1. Rash  Comments:  Did punch biopsy on left arm.  Patient tolerated well.  Consent form signed  He has July 10 appointment with private Derm clinic,  Orders:  -     lidocaine-epinephrine (XYLOCAINE/EPINEPHRINE) 1 %-1:100,000 injection 2 mL  -     Tissue Exam; Future  -     hydrocortisone 2.5 % cream; Apply topically 2 (two) times a day  -     predniSONE 10 mg tablet; Take 1 tablet (10 mg total) by mouth daily for 5 days  2. Periorbital dermatitis  Assessment & Plan:  Rash for almost 2 mo unclear etiology.  Was seen by urgent care May 1 given Keflex 500 mg and Bactroban.  -Was seen by us May 14 given prednisone for 10 days  -Was seen as a follow-up by us on May 23 given prednisone and doxycycline 100 mg capsule twice a day for 7 days.  -Initially rash on arm leg and around eye   -Currently rash only around eyes, on arms please see picture below not itchy no discharge no new medications no over-the-counter medications no new lotions no history of allergy  -No vision change no prior history of rash.  -Patient reports after finishing medication course,rash returns usually.  -Please see pictures taken on May 14 and May 31 for comparison  -Patient tried antifungal on May 31 reports that if made the rash worse after that he was seen in ER given Bactroban  -Telemedicine appointment on Kimberly 10 given again steroids for 14 days 10 mg  - referral placed for dermatology  -No family history of connective tissue disorders.  Or autoimmune disorders.  - autoimmune workup today, C-reactive protein and sed rate, DIANNA, C4 C3 complement, ANCN, CBC, CMP, lupus antibody within normal limits  -Patient is not sexually active for almost 4 yr, no concern of sexually transmitted disease  -Has appointment  with Derm on July 30.  -Rash significantly improved after steroid use for 10 days, patient was seen by me on June 24.    -Rash was significantly improved on June 24 after steroid p.o. use.    -Today June 27 his rash is worse compared to prior.    -He does have appointment with dermatology on July 10   -We will do punch biopsy on his left arm   -Gave topical 2.5% hydrocortisone for rash on arms   -Gave also p.o. steroids in case if the topical does not help   -Patient tolerated well punch biopsy on left arm  -Consent form signed and scanned into chart       History of Present Illness     HPI  Patient is here regarding periorbital rash and also rash on his left and right arm.  This has been an ongoing issue since April.  He does not itchy.  Patient took prior antibiotics steroids and antifungal medications.  Reports that steroids are helping significantly.  He does have appointment with dermatology July 10 we will do punch biopsy today hopefully by that time he will have the biopsy results.    Please see above regarding plan  Review of Systems   Constitutional:  Negative for chills and fever.   HENT:  Negative for ear pain and sore throat.    Eyes:  Negative for pain and visual disturbance.   Respiratory:  Negative for cough and shortness of breath.    Cardiovascular:  Negative for chest pain and palpitations.   Gastrointestinal:  Negative for abdominal pain and vomiting.   Genitourinary:  Negative for dysuria and hematuria.   Musculoskeletal:  Negative for arthralgias and back pain.   Skin:  Positive for rash. Negative for color change.   Neurological:  Negative for seizures and syncope.   All other systems reviewed and are negative.    Medical History Reviewed by provider this encounter:       Current Outpatient Medications on File Prior to Visit   Medication Sig Dispense Refill    levalbuterol (Xopenex HFA) 45 mcg/act inhaler Inhale 1-2 puffs every 4 (four) hours as needed for wheezing or shortness of breath 15 g 2     NON FORMULARY Medical marijuana      mupirocin (BACTROBAN) 2 % ointment Apply topically 3 (three) times a day (Patient not taking: Reported on 6/24/2024) 15 g 0    [DISCONTINUED] predniSONE 10 mg tablet Take 1 tablet (10 mg total) by mouth daily With food (Patient not taking: Reported on 6/24/2024) 14 tablet 0     No current facility-administered medications on file prior to visit.      Social History     Tobacco Use    Smoking status: Former     Types: Cigarettes     Start date: 10/29/2017     Passive exposure: Current    Smokeless tobacco: Never   Vaping Use    Vaping status: Former    Start date: 10/29/2017    Substances: Nicotine, THC, Flavoring   Substance and Sexual Activity    Alcohol use: Yes     Comment: Occasionally    Drug use: Yes     Types: Marijuana    Sexual activity: Not Currently     Objective     /78   Pulse 69   Temp 98 °F (36.7 °C)   Resp 18   Wt 73.5 kg (162 lb)   SpO2 98%   BMI 22.59 kg/m²     Physical Exam  Vitals and nursing note reviewed.   Constitutional:       General: He is not in acute distress.     Appearance: He is well-developed.   HENT:      Head: Normocephalic and atraumatic.   Eyes:      Conjunctiva/sclera: Conjunctivae normal.   Cardiovascular:      Rate and Rhythm: Normal rate and regular rhythm.      Heart sounds: No murmur heard.  Pulmonary:      Effort: Pulmonary effort is normal. No respiratory distress.      Breath sounds: Normal breath sounds.   Abdominal:      Palpations: Abdomen is soft.      Tenderness: There is no abdominal tenderness.   Musculoskeletal:         General: No swelling.      Cervical back: Neck supple.   Skin:     General: Skin is warm and dry.      Capillary Refill: Capillary refill takes less than 2 seconds.      Findings: Rash present.      Comments: Periorbital rash present bilaterally   also rash on his left arm and right arm.   Neurological:      Mental Status: He is alert.   Psychiatric:         Mood and Affect: Mood normal.              I have spent a total time of 20 minutes on 06/27/24 In caring for this patient including Prognosis, Instructions for management, Patient and family education, Importance of tx compliance, Risk factor reductions, Impressions, Counseling / Coordination of care, Reviewing / ordering tests, medicine, procedures  , and Obtaining or reviewing history  .

## 2024-06-28 ENCOUNTER — RX ONLY (OUTPATIENT)
Age: 24
Setting detail: RX ONLY
End: 2024-06-28

## 2024-07-03 PROCEDURE — 88305 TISSUE EXAM BY PATHOLOGIST: CPT | Performed by: PATHOLOGY

## 2024-07-03 PROCEDURE — 88312 SPECIAL STAINS GROUP 1: CPT | Performed by: PATHOLOGY

## 2024-07-10 ENCOUNTER — APPOINTMENT (RX ONLY)
Dept: URBAN - NONMETROPOLITAN AREA CLINIC 4 | Facility: CLINIC | Age: 24
Setting detail: DERMATOLOGY
End: 2024-07-10

## 2024-07-10 DIAGNOSIS — L259 CONTACT DERMATITIS AND OTHER ECZEMA, UNSPECIFIED CAUSE: ICD-10-CM

## 2024-07-10 PROBLEM — L30.8 OTHER SPECIFIED DERMATITIS: Status: ACTIVE | Noted: 2024-07-10

## 2024-07-10 PROCEDURE — ? TREATMENT REGIMEN

## 2024-07-10 PROCEDURE — ? PHOTO-DOCUMENTATION

## 2024-07-10 PROCEDURE — ? PRESCRIPTION

## 2024-07-10 PROCEDURE — ? PRESCRIPTION MEDICATION MANAGEMENT

## 2024-07-10 PROCEDURE — ? COUNSELING

## 2024-07-10 PROCEDURE — 99204 OFFICE O/P NEW MOD 45 MIN: CPT

## 2024-07-10 RX ORDER — TRIAMCINOLONE ACETONIDE 1 MG/G
0.1% CREAM TOPICAL BID
Qty: 453.6 | Refills: 0 | Status: ERX | COMMUNITY
Start: 2024-07-10

## 2024-07-10 RX ADMIN — TRIAMCINOLONE ACETONIDE 0.1%: 1 CREAM TOPICAL at 00:00

## 2024-07-10 ASSESSMENT — SEVERITY ASSESSMENT 2020: SEVERITY 2020: MILD

## 2024-07-10 ASSESSMENT — LOCATION DETAILED DESCRIPTION DERM
LOCATION DETAILED: RIGHT CENTRAL MALAR CHEEK
LOCATION DETAILED: LEFT PROXIMAL POSTERIOR UPPER ARM
LOCATION DETAILED: RIGHT PROXIMAL POSTERIOR UPPER ARM
LOCATION DETAILED: LEFT SUPERIOR CENTRAL MALAR CHEEK
LOCATION DETAILED: LEFT PROXIMAL DORSAL FOREARM

## 2024-07-10 ASSESSMENT — ITCH NUMERIC RATING SCALE: HOW SEVERE IS YOUR ITCHING?: 3

## 2024-07-10 ASSESSMENT — LOCATION SIMPLE DESCRIPTION DERM
LOCATION SIMPLE: RIGHT CHEEK
LOCATION SIMPLE: LEFT UPPER ARM
LOCATION SIMPLE: LEFT CHEEK
LOCATION SIMPLE: RIGHT UPPER ARM
LOCATION SIMPLE: LEFT FOREARM

## 2024-07-10 ASSESSMENT — LOCATION ZONE DERM
LOCATION ZONE: FACE
LOCATION ZONE: ARM

## 2024-07-10 NOTE — PROCEDURE: TREATMENT REGIMEN
Detail Level: Zone
Initiate Treatment: Zyrtec 10mg BID x 2 weeks then QD thereafter
Continue Regimen: Daily moisturizing cream Cerave or Cetaphil

## 2024-07-10 NOTE — PROCEDURE: PRESCRIPTION MEDICATION MANAGEMENT
Initiate Treatment: Triamcinolone 0.1% cream to AA trunk and extremities BID PRN
Detail Level: Zone
Render In Strict Bullet Format?: No
Continue Regimen: Hydrocortisone 2.5% cream to AA face BID PRN

## 2024-07-10 NOTE — HPI: RASH
What Type Of Note Output Would You Prefer (Optional)?: Standard Output
Is The Patient Presenting As Previously Scheduled?: No, they are a work-in
How Severe Is Your Rash?: mild
Is This A New Presentation, Or A Follow-Up?: Rash
Additional History: Patient has seen his PCP who prescribed the Hydrocortisone 2.5% cream which helps his face but does little for the body.  He also did a punch biopsy which revealed eczematous dermatitis.

## 2024-08-19 PROBLEM — Z87.898 HISTORY OF SUBSTANCE USE: Status: RESOLVED | Noted: 2021-02-05 | Resolved: 2024-08-19

## 2024-08-19 PROBLEM — M79.644 THUMB PAIN, RIGHT: Status: RESOLVED | Noted: 2018-01-31 | Resolved: 2024-08-19

## 2024-08-19 PROBLEM — Z77.21 HISTORY OF EXPOSURE TO HAZARDOUS BODILY FLUIDS: Status: RESOLVED | Noted: 2019-07-19 | Resolved: 2024-08-19

## 2024-08-19 PROBLEM — R21 RASH: Status: RESOLVED | Noted: 2024-06-03 | Resolved: 2024-08-19

## 2024-08-19 PROBLEM — R07.9 CHEST PAIN: Status: RESOLVED | Noted: 2023-01-06 | Resolved: 2024-08-19

## 2024-08-19 PROBLEM — H60.02: Status: RESOLVED | Noted: 2022-08-05 | Resolved: 2024-08-19

## 2024-08-19 PROBLEM — Z86.59 HISTORY OF SUICIDAL IDEATION: Status: RESOLVED | Noted: 2019-07-19 | Resolved: 2024-08-19

## 2024-08-20 ENCOUNTER — OFFICE VISIT (OUTPATIENT)
Dept: FAMILY MEDICINE CLINIC | Facility: HOME HEALTHCARE | Age: 24
End: 2024-08-20
Payer: COMMERCIAL

## 2024-08-20 VITALS
OXYGEN SATURATION: 98 % | RESPIRATION RATE: 18 BRPM | BODY MASS INDEX: 23.57 KG/M2 | HEART RATE: 68 BPM | SYSTOLIC BLOOD PRESSURE: 118 MMHG | TEMPERATURE: 98 F | WEIGHT: 169 LBS | DIASTOLIC BLOOD PRESSURE: 64 MMHG

## 2024-08-20 DIAGNOSIS — R22.1 MASS OF LEFT SIDE OF NECK: ICD-10-CM

## 2024-08-20 DIAGNOSIS — R59.0 LYMPHADENOPATHY OF LEFT CERVICAL REGION: Primary | ICD-10-CM

## 2024-08-20 PROBLEM — L30.9 PERIORBITAL DERMATITIS: Status: RESOLVED | Noted: 2024-06-17 | Resolved: 2024-08-20

## 2024-08-20 PROBLEM — K62.5 RECTAL BLEEDING: Status: RESOLVED | Noted: 2023-01-06 | Resolved: 2024-08-20

## 2024-08-20 PROBLEM — R46.89 SPELL OF ABNORMAL BEHAVIOR: Status: RESOLVED | Noted: 2017-09-12 | Resolved: 2024-08-20

## 2024-08-20 PROCEDURE — T1015 CLINIC SERVICE: HCPCS | Performed by: FAMILY MEDICINE

## 2024-08-20 RX ORDER — TRIAMCINOLONE ACETONIDE 1 MG/G
0.1 CREAM TOPICAL 2 TIMES DAILY
COMMUNITY
Start: 2024-07-10

## 2024-08-20 NOTE — PROGRESS NOTES
Ambulatory Visit  Name: Paul Mondragon      : 2000      MRN: [id number]  Encounter Provider: Majo Huffman MD  Encounter Date: 2024   Encounter department: [facility]    Assessment & Plan   Patient has persistent swelling at left cervical region. From my physical exam, there is at least 2 palpable lymph nodes, in addition to the soft tissue mass at left cervical region. Although fine needle aspiration 22 showed mixed inflammatory cell, the rate of false negative result is common for this type of test. He is strongly urged to see ENT for further discussion, whether he needs core needle biopsy, surgical excision, or additional treatment.     It is possible that the source of his issue are in the soft tissue, and he has reactive lymphadenopathy as a result of this. If ENT is not able to see him at a timely manner, consider interventional radiology to see if they can do core needle biopsy for the soft tissue area, and not simply his lymph nodes.     He can benefit from positive encouragement and timely follow up to help him with his condition.     1. Lymphadenopathy of left cervical region  -     Ambulatory Referral to Otolaryngology; Future  -     US head neck soft tissue; Future; Expected date: 2024  2. Mass of left side of neck       History of Present Illness     24 year old man PMH left cervical lymphadenopathy for past 3 years presents for discussion of his left neck swelling. He first noted left neck mass with lymph nodes at age 21. Since then, he has swelling in the area that has not increased in size (per patient). Denies pain in the area. Denies fever, malaise, weight loss, sore throat, drainage, cough, dental problems. He was previously evaluated in  with ultrasound, CT, and fine needle aspiration however the flow cytometry was cancelled due to sampling problem.     Past workup:       22: CT soft tissue neck with IV contrast  IMPRESSION:  1.   Soft tissue inflammation in the inferior left periauricular region also involving posterior parotid gland.  No discrete rim-enhancing collection.  2.  Significant left cervical level II and III lymphadenopathy, out of proportion to degree of soft tissue inflammation.  Recommend correlation with serology for atypical infection and clinical follow-up.  May consider tissue sampling if lymphadenopathy   persists despite appropriate treatment.    US 8/16/22:  The patient's left retroauricular lump is a 1.6 x 0.6 x 1.6 cm solid subcutaneous lesion.  There is no normal fatty hilum of a lymph node.  There are multiple additional enlarged left cervical lymph nodes as above.    9/21/22 fine needle aspiration  Final Diagnosis   A-B-C. Lymph Node, Left Level II (ThinPrep, smear and cell block preparations)  Non-diagnostic  Mixed inflammatory cells with red blood cells, most compatible with blood elements.   Electronically signed by Paul Medina MD on 9/21/2022 at     9/21/22 flow cytometry workup  .  Not adequate- more for cytolyt and flow  Radha Blancas, Interpretation performed at [facility], [address] [place] [address]    Review of Systems   Constitutional:  Negative for activity change and fever.   HENT:  Negative for hearing loss and sore throat.    Eyes:  Negative for discharge and visual disturbance.   Respiratory:  Negative for cough and shortness of breath.    Cardiovascular:  Negative for chest pain and palpitations.   Gastrointestinal:  Negative for abdominal pain and nausea.   Genitourinary:  Negative for difficulty urinating and dysuria.   Musculoskeletal:  Negative for arthralgias, back pain and neck pain.   Skin:  Negative for color change and rash.   Neurological:  Negative for dizziness and light-headedness.       Objective     /64   Pulse 68   Temp 98 °F (36.7 °C) (Tympanic)   Resp 18   Wt 76.7 kg (169 lb)   SpO2 98%   BMI 23.57 kg/m²     Physical Exam  Vitals reviewed.    Constitutional:       Appearance: Normal appearance. He is not ill-appearing.   HENT:      Head: Normocephalic and atraumatic.      Right Ear: Tympanic membrane normal.      Left Ear: Tympanic membrane normal.      Nose: Nose normal. No congestion.      Mouth/Throat:      Mouth: Mucous membranes are moist.      Pharynx: Oropharynx is clear. No posterior oropharyngeal erythema.      Comments: Good dentition  Eyes:      Extraocular Movements: Extraocular movements intact.      Conjunctiva/sclera: Conjunctivae normal.   Cardiovascular:      Rate and Rhythm: Normal rate.   Pulmonary:      Effort: Pulmonary effort is normal. No respiratory distress.   Abdominal:      General: Abdomen is flat.   Musculoskeletal:      Cervical back: Neck supple. No tenderness.   Lymphadenopathy:      Head:      Right side of head: No posterior auricular adenopathy.      Left side of head: No posterior auricular adenopathy.      Cervical: Cervical adenopathy present.      Left cervical: No superficial cervical adenopathy.      Upper Body:      Right upper body: No supraclavicular adenopathy.      Left upper body: No supraclavicular adenopathy.      Lower Body: No right inguinal adenopathy. No left inguinal adenopathy.      Comments: Left anterior cervical lymph nodes: there are 2 palpable lymph nodes, mobile, round, soft. Size estimated with soft ruler 2cm by 2cm.   Left anterior neck with palpable mass-like structure surrounding the lymph nodes. Appears to be soft tissue    Skin:     General: Skin is warm.      Findings: No rash.   Neurological:      General: No focal deficit present.      Mental Status: He is alert. Mental status is at baseline.   Psychiatric:         Mood and Affect: Mood normal.         Behavior: Behavior normal.       Administrative Statements

## 2024-09-03 ENCOUNTER — HOSPITAL ENCOUNTER (OUTPATIENT)
Dept: ULTRASOUND IMAGING | Facility: HOSPITAL | Age: 24
Discharge: HOME | End: 2024-09-03
Payer: COMMERCIAL

## 2024-09-03 ENCOUNTER — TELEPHONE (OUTPATIENT)
Dept: FAMILY MEDICINE CLINIC | Facility: HOME HEALTHCARE | Age: 24
End: 2024-09-03

## 2024-09-03 DIAGNOSIS — R59.0 LYMPHADENOPATHY OF LEFT CERVICAL REGION: ICD-10-CM

## 2024-09-03 PROCEDURE — 76536 US EXAM OF HEAD AND NECK: CPT

## 2024-09-03 NOTE — TELEPHONE ENCOUNTER
Received ultrasound report about Paul Mondragon. I called both of his listed phone number but they went to voice mail. In my voice mail I explained to him that there is increased in size of the lymph nodes, he needs to see an ENT doctor asap to do a repeat biopsy of lymph node and/or soft tissue.

## 2024-09-23 NOTE — PROGRESS NOTES
Adult Annual Physical  Name: Paul Mondragon      : 2000      MRN: [id number]  Encounter Provider: Majo Huffman MD  Encounter Date: 2024   Encounter department: [facility]    Assessment & Plan  Annual physical exam  Annual physical exam handout provided to patient.  Discussed diet, exercise, sleep hygiene, drug use, vaping, alcohol, smoking, sexual health, and driving safety.  Lab work completed within the last year and reviewed.  Patient declined STD screening    Med rec reviewed, counseled on the side effect of chronic topical steroid use.  He should use topical steroid 2 weeks on and 1 week off.  He will discuss with dermatologist tomorrow.  This was initially started for angioedema of the face, at the time eczema was suspected.  He uses 1 type of steroid for his face and another type for his body.       Encounter for immunization    Orders:    TDAP VACCINE GREATER THAN OR EQUAL TO 8YO IM    Pneumococcal Conjugate Vaccine 20-valent (Pcv20)    MENINGOCOCCAL ACYW-135 TT CONJUGATE    HPV VACCINE 9 VALENT IM    Lymphadenopathy of left cervical region  Patient obtained repeat ultrasound.  Has appointment with ENT  At [facility] on 10/8/2024.  I printed out his ultrasound report for him to bring to this appointment       Psychogenic nonepileptic seizure  Patient was previously seeing neurology and diagnosed with psychogenic nonepileptic tic seizure.  He is supposed to see a behavioral health specialist for cognitive behavioral therapy.  Referral provided.   Orders:    Ambulatory referral to Psych Services; Future    Immunizations and preventive care screenings were discussed with patient today. Appropriate education was printed on patient's after visit summary.    Counseling:  Alcohol/drug use: discussed moderation in alcohol intake, the recommendations for healthy alcohol use, and avoidance of illicit drug use.  Dental Health: discussed importance of regular tooth  brushing, flossing, and dental visits.  Sexual health: discussed sexually transmitted diseases, partner selection, use of condoms, avoidance of unintended pregnancy, and contraceptive alternatives.         History of Present Illness     Adult Annual Physical:  Patient presents for annual physical. 24-year-old man with chronic left neck swelling presents for annual physical exam..     Diet and Physical Activity:  - Diet/Nutrition: well balanced diet.  - Exercise: walking.    General Health:    - Hearing: normal hearing bilateral ears.  - Vision: no vision problems.  - Dental: no dental visits for > 1 year.    /GYN Health:    - History of STDs: no     Health:  - History of STDs: no.     Review of Systems      Objective     /70   Temp 98 °F (36.7 °C)   Resp 20   Wt 76.3 kg (168 lb 3.2 oz)   SpO2 96%   BMI 23.46 kg/m²     Physical Exam  Vitals reviewed.   Constitutional:       General: He is not in acute distress.     Appearance: He is well-developed.   HENT:      Head: Normocephalic and atraumatic.      Nose: Nose normal.   Eyes:      Extraocular Movements: Extraocular movements intact.      Conjunctiva/sclera: Conjunctivae normal.   Neck:      Comments: -Left anterior cervical lymph nodes: there are 2 palpable lymph nodes, mobile, round, soft.   -Left lateral neck with palpable mass-like structure surrounding the lymph nodes. Appears to be soft tissue growth.     Cardiovascular:      Rate and Rhythm: Normal rate and regular rhythm.      Heart sounds: Normal heart sounds. No murmur heard.  Pulmonary:      Effort: Pulmonary effort is normal. No respiratory distress.      Breath sounds: Normal breath sounds. No stridor. No wheezing, rhonchi or rales.   Abdominal:      General: Bowel sounds are normal. There is no distension.      Palpations: Abdomen is soft. There is no mass.      Tenderness: There is no abdominal tenderness.   Musculoskeletal:      Cervical back: Neck supple.   Lymphadenopathy:       Cervical: Cervical adenopathy present.   Skin:     General: Skin is warm and dry.   Neurological:      General: No focal deficit present.      Mental Status: He is alert. Mental status is at baseline.   Psychiatric:         Mood and Affect: Mood normal.         Behavior: Behavior normal.

## 2024-09-24 ENCOUNTER — OFFICE VISIT (OUTPATIENT)
Dept: FAMILY MEDICINE CLINIC | Facility: HOME HEALTHCARE | Age: 24
End: 2024-09-24
Payer: COMMERCIAL

## 2024-09-24 VITALS
OXYGEN SATURATION: 96 % | BODY MASS INDEX: 23.46 KG/M2 | DIASTOLIC BLOOD PRESSURE: 70 MMHG | TEMPERATURE: 98 F | WEIGHT: 168.2 LBS | SYSTOLIC BLOOD PRESSURE: 118 MMHG | RESPIRATION RATE: 20 BRPM

## 2024-09-24 DIAGNOSIS — R59.0 LYMPHADENOPATHY OF LEFT CERVICAL REGION: ICD-10-CM

## 2024-09-24 DIAGNOSIS — F44.5 PSYCHOGENIC NONEPILEPTIC SEIZURE: ICD-10-CM

## 2024-09-24 DIAGNOSIS — Z23 ENCOUNTER FOR IMMUNIZATION: ICD-10-CM

## 2024-09-24 DIAGNOSIS — Z00.00 ANNUAL PHYSICAL EXAM: Primary | ICD-10-CM

## 2024-09-24 PROBLEM — Z72.0 TOBACCO ABUSE: Status: RESOLVED | Noted: 2021-02-05 | Resolved: 2024-09-24

## 2024-09-24 PROBLEM — R56.9 SEIZURES (HCC): Status: RESOLVED | Noted: 2018-01-31 | Resolved: 2024-09-24

## 2024-09-24 PROCEDURE — T1015 CLINIC SERVICE: HCPCS | Performed by: FAMILY MEDICINE

## 2024-09-24 NOTE — ASSESSMENT & PLAN NOTE
Patient obtained repeat ultrasound.  Has appointment with ENT  At [facility] on 10/8/2024.  I printed out his ultrasound report for him to bring to this appointment

## 2024-09-25 ENCOUNTER — TELEPHONE (OUTPATIENT)
Age: 24
End: 2024-09-25

## 2024-09-25 ENCOUNTER — APPOINTMENT (RX ONLY)
Dept: URBAN - NONMETROPOLITAN AREA CLINIC 4 | Facility: CLINIC | Age: 24
Setting detail: DERMATOLOGY
End: 2024-09-25

## 2024-09-25 DIAGNOSIS — L259 CONTACT DERMATITIS AND OTHER ECZEMA, UNSPECIFIED CAUSE: ICD-10-CM | Status: WELL CONTROLLED

## 2024-09-25 PROBLEM — L30.8 OTHER SPECIFIED DERMATITIS: Status: ACTIVE | Noted: 2024-09-25

## 2024-09-25 PROCEDURE — ? PRESCRIPTION MEDICATION MANAGEMENT

## 2024-09-25 PROCEDURE — 99213 OFFICE O/P EST LOW 20 MIN: CPT

## 2024-09-25 PROCEDURE — ? TREATMENT REGIMEN

## 2024-09-25 PROCEDURE — ? COUNSELING

## 2024-09-25 ASSESSMENT — LOCATION SIMPLE DESCRIPTION DERM
LOCATION SIMPLE: RIGHT UPPER ARM
LOCATION SIMPLE: LEFT UPPER ARM
LOCATION SIMPLE: LEFT PRETIBIAL REGION
LOCATION SIMPLE: LEFT FOREARM
LOCATION SIMPLE: RIGHT PRETIBIAL REGION
LOCATION SIMPLE: RIGHT FOREARM

## 2024-09-25 ASSESSMENT — SEVERITY ASSESSMENT 2020: SEVERITY 2020: ALMOST CLEAR

## 2024-09-25 ASSESSMENT — LOCATION DETAILED DESCRIPTION DERM
LOCATION DETAILED: LEFT PROXIMAL PRETIBIAL REGION
LOCATION DETAILED: LEFT PROXIMAL POSTERIOR UPPER ARM
LOCATION DETAILED: LEFT DISTAL DORSAL FOREARM
LOCATION DETAILED: RIGHT PROXIMAL PRETIBIAL REGION
LOCATION DETAILED: RIGHT PROXIMAL DORSAL FOREARM
LOCATION DETAILED: RIGHT DISTAL POSTERIOR UPPER ARM

## 2024-09-25 ASSESSMENT — LOCATION ZONE DERM
LOCATION ZONE: LEG
LOCATION ZONE: ARM

## 2024-09-25 ASSESSMENT — BSA RASH: BSA RASH: 2

## 2024-09-25 ASSESSMENT — PAIN INTENSITY VAS: HOW INTENSE IS YOUR PAIN 0 BEING NO PAIN, 10 BEING THE MOST SEVERE PAIN POSSIBLE?: NO PAIN

## 2024-09-25 ASSESSMENT — ITCH NUMERIC RATING SCALE: HOW SEVERE IS YOUR ITCHING?: 0

## 2024-09-25 NOTE — TELEPHONE ENCOUNTER
Contacted patient in regards to Routine Referral in attempts to verify patient's needs of services and add patient to proper wait list. LVM for patient to contact intake dept  in regards to referral for services.     1st attempt.

## 2024-09-25 NOTE — PROCEDURE: PRESCRIPTION MEDICATION MANAGEMENT
Render In Strict Bullet Format?: No
Continue Regimen: Hydrocortisone 2.5% topical cream BID PRN , Triamcinolone 0.1% topical cream A on body BID . Zyrtec 10 mg QD
Plan: Will follow up in 3 months .
Detail Level: Zone

## 2024-09-30 NOTE — TELEPHONE ENCOUNTER
Contacted patient in regards to Routine Referral in attempts to verify patient's needs of services and add patient to proper wait list. spoke with patient whom stated is interested in talk therapy services. Writer verified pt information and added pt to wait list.     Closing referral.

## 2024-10-15 ENCOUNTER — TELEPHONE (OUTPATIENT)
Age: 24
End: 2024-10-15

## 2024-10-15 NOTE — TELEPHONE ENCOUNTER
Contacted patient off of Talk Therapy  to verify insurance as current insurance, [employer] will be OON starting January. LVM for patient to return call in regards to insurance denial and outside resources.     1st attempt  OON insurance.

## 2024-10-17 NOTE — TELEPHONE ENCOUNTER
Contacted patient off of Talk Therapy  wait list to verify needs of services in attempts to notify patient of insurance denial due to [employer] no longer being in-PAR with [employer] and offer extra resource options. Spoke to patient who verbalized understanding and requested resources to be mailed out.     Attempt #1  Pt removed from wait list.

## 2024-11-25 ENCOUNTER — TELEMEDICINE (OUTPATIENT)
Dept: OTHER | Facility: HOSPITAL | Age: 24
End: 2024-11-25
Payer: COMMERCIAL

## 2024-11-25 DIAGNOSIS — J45.20 MILD INTERMITTENT ASTHMA WITHOUT COMPLICATION: ICD-10-CM

## 2024-11-25 DIAGNOSIS — F41.9 ANXIETY: Primary | ICD-10-CM

## 2024-11-25 PROCEDURE — 99213 OFFICE O/P EST LOW 20 MIN: CPT | Performed by: NURSE PRACTITIONER

## 2024-11-25 RX ORDER — ACYCLOVIR 400 MG/1
400 TABLET ORAL 2 TIMES DAILY
COMMUNITY
Start: 2024-11-22

## 2024-11-25 RX ORDER — LEVALBUTEROL TARTRATE 45 UG/1
1-2 AEROSOL, METERED ORAL EVERY 4 HOURS PRN
Qty: 15 G | Refills: 2 | Status: SHIPPED | OUTPATIENT
Start: 2024-11-25

## 2024-11-25 RX ORDER — SULFAMETHOXAZOLE AND TRIMETHOPRIM 800; 160 MG/1; MG/1
TABLET ORAL
COMMUNITY
Start: 2024-11-22

## 2024-11-25 RX ORDER — METOPROLOL TARTRATE 25 MG/1
25 TABLET, FILM COATED ORAL 2 TIMES DAILY
COMMUNITY
Start: 2024-11-25 | End: 2025-11-25

## 2024-11-25 RX ORDER — HYDROXYZINE HYDROCHLORIDE 25 MG/1
25 TABLET, FILM COATED ORAL EVERY 8 HOURS PRN
Qty: 15 TABLET | Refills: 0 | Status: SHIPPED | OUTPATIENT
Start: 2024-11-25 | End: 2024-11-30

## 2024-11-25 NOTE — PATIENT INSTRUCTIONS
We can try Atarax to see if this helps.  Please call and discuss with oncologist to make sure there is no interaction with your infusion.  Practice calming techniques that helps with you anxiety.  Will also refill your inhaler.

## 2024-11-25 NOTE — PROGRESS NOTES
Virtual Regular Visit  Name: Paul Mondragon      : 2000      MRN: [id number]  Encounter Provider: Your Video Visit Provider  Encounter Date: 2024   Encounter department: VIRTUAL CARE       Verification of patient location:  Patient is located at Home in the following state in which I hold an active license [place] :  Assessment & Plan  Mild intermittent asthma without complication    Orders:    levalbuterol (Xopenex HFA) 45 mcg/act inhaler; Inhale 1-2 puffs every 4 (four) hours as needed for wheezing or shortness of breath    Anxiety    Orders:    hydrOXYzine HCL (ATARAX) 25 mg tablet; Take 1 tablet (25 mg total) by mouth every 8 (eight) hours as needed for itching or anxiety for up to 5 days        Encounter provider Your Video Visit Provider    The patient was identified by name and date of birth. Paul Mondragon was informed that this is a telemedicine visit and that the visit is being conducted through the Epic Embedded platform. He agrees to proceed..  My office door was closed. No one else was in the room.  He acknowledged consent and understanding of privacy and security of the video platform. The patient has agreed to participate and understands they can discontinue the visit at any time.    Patient is aware this is a billable service.     History was obtained from: History obtained from: patient  History of Present Illness     This is a 24 year old male here today for video visit.  He states he was diagnosed with hodgkin's lymphoma in the last several months. .  He states he has been feeling overwhelmed and having anxiety. He is having increased anxiety due to doctors appointments.   No thoughts of harming self.  No chest pain or sob.  He did see Cardiology today and he was started on metoprolol due to having some chest pain and palpitations.   He has never been on anxiety medication.  He is also requesting and refill of his inhaler.       Review of Systems   Constitutional:  Negative for  activity change, chills and fatigue.   Respiratory: Negative.     Cardiovascular: Negative.    Genitourinary: Negative.    Psychiatric/Behavioral:  Negative for self-injury, sleep disturbance and suicidal ideas. The patient is nervous/anxious.        Objective   There were no vitals taken for this visit.    Physical Exam  Constitutional:       General: He is not in acute distress.     Appearance: Normal appearance. He is not toxic-appearing.   HENT:      Head: Normocephalic and atraumatic.   Pulmonary:      Effort: Pulmonary effort is normal. No respiratory distress.   Neurological:      Mental Status: He is alert and oriented to person, place, and time.   Psychiatric:         Mood and Affect: Mood normal.         Behavior: Behavior normal.         Thought Content: Thought content normal.         Judgment: Judgment normal.         Visit Time  Total Visit Duration: 8 minutes not including the time spent for establishing the audio/video connection.

## 2024-11-25 NOTE — ASSESSMENT & PLAN NOTE
Orders:    levalbuterol (Xopenex HFA) 45 mcg/act inhaler; Inhale 1-2 puffs every 4 (four) hours as needed for wheezing or shortness of breath

## 2024-11-25 NOTE — ASSESSMENT & PLAN NOTE
Orders:    hydrOXYzine HCL (ATARAX) 25 mg tablet; Take 1 tablet (25 mg total) by mouth every 8 (eight) hours as needed for itching or anxiety for up to 5 days

## 2025-07-10 ENCOUNTER — TELEPHONE (OUTPATIENT)
Age: 25
End: 2025-07-10

## 2025-07-10 NOTE — TELEPHONE ENCOUNTER
Patient called in to schedule follow up appointment LOV 11/17/2023 with Dr. Tinsley.    Appointment scheduled 8/8/2025 at 11 am with Yesi Liang in [place] office,

## 2025-08-06 ENCOUNTER — TELEPHONE (OUTPATIENT)
Dept: NEUROLOGY | Facility: CLINIC | Age: 25
End: 2025-08-06

## 2025-08-16 ENCOUNTER — APPOINTMENT (EMERGENCY)
Dept: RADIOLOGY | Facility: HOSPITAL | Age: 25
End: 2025-08-16
Payer: COMMERCIAL

## 2025-08-16 ENCOUNTER — HOSPITAL ENCOUNTER (EMERGENCY)
Facility: HOSPITAL | Age: 25
Discharge: HOME | End: 2025-08-16
Attending: EMERGENCY MEDICINE | Admitting: EMERGENCY MEDICINE
Payer: COMMERCIAL

## 2025-08-16 VITALS
RESPIRATION RATE: 18 BRPM | HEART RATE: 91 BPM | OXYGEN SATURATION: 96 % | TEMPERATURE: 98.8 F | SYSTOLIC BLOOD PRESSURE: 115 MMHG | DIASTOLIC BLOOD PRESSURE: 92 MMHG

## 2025-08-16 DIAGNOSIS — S93.401A RIGHT ANKLE SPRAIN: Primary | ICD-10-CM

## 2025-08-16 PROCEDURE — 99283 EMERGENCY DEPT VISIT LOW MDM: CPT

## 2025-08-16 PROCEDURE — 99284 EMERGENCY DEPT VISIT MOD MDM: CPT

## 2025-08-16 PROCEDURE — 73610 X-RAY EXAM OF ANKLE: CPT

## 2025-08-16 PROCEDURE — 73630 X-RAY EXAM OF FOOT: CPT

## 2025-08-16 RX ORDER — ESCITALOPRAM OXALATE 10 MG/1
10 TABLET ORAL DAILY
COMMUNITY

## 2025-08-16 RX ORDER — ACETAMINOPHEN 325 MG/1
650 TABLET ORAL ONCE
Status: COMPLETED | OUTPATIENT
Start: 2025-08-16 | End: 2025-08-16

## 2025-08-16 RX ORDER — LEVOTHYROXINE SODIUM 88 UG/1
88 TABLET ORAL DAILY
COMMUNITY
Start: 2025-08-07

## 2025-08-16 RX ADMIN — ACETAMINOPHEN 650 MG: 325 TABLET ORAL at 15:55

## 2025-08-18 ENCOUNTER — OFFICE VISIT (OUTPATIENT)
Dept: PODIATRY | Facility: CLINIC | Age: 25
End: 2025-08-18
Payer: COMMERCIAL

## 2025-08-18 VITALS — BODY MASS INDEX: 25.2 KG/M2 | WEIGHT: 180 LBS | HEIGHT: 71 IN

## 2025-08-18 DIAGNOSIS — S82.61XA CLOSED AVULSION FRACTURE OF LATERAL MALLEOLUS OF RIGHT FIBULA, INITIAL ENCOUNTER: Primary | ICD-10-CM

## 2025-08-18 DIAGNOSIS — S93.491A SPRAIN OF ANTERIOR TALOFIBULAR LIGAMENT OF RIGHT ANKLE, INITIAL ENCOUNTER: ICD-10-CM

## 2025-08-18 PROCEDURE — 99204 OFFICE O/P NEW MOD 45 MIN: CPT | Performed by: PODIATRIST
